# Patient Record
Sex: FEMALE | Race: WHITE | NOT HISPANIC OR LATINO | Employment: FULL TIME | URBAN - METROPOLITAN AREA
[De-identification: names, ages, dates, MRNs, and addresses within clinical notes are randomized per-mention and may not be internally consistent; named-entity substitution may affect disease eponyms.]

---

## 2019-12-02 ENCOUNTER — TELEPHONE (OUTPATIENT)
Dept: FAMILY MEDICINE | Facility: CLINIC | Age: 49
End: 2019-12-02

## 2019-12-04 ENCOUNTER — TELEPHONE (OUTPATIENT)
Dept: FAMILY MEDICINE | Facility: CLINIC | Age: 49
End: 2019-12-04

## 2019-12-04 ENCOUNTER — OFFICE VISIT (OUTPATIENT)
Dept: FAMILY MEDICINE | Facility: CLINIC | Age: 49
End: 2019-12-04
Payer: COMMERCIAL

## 2019-12-04 ENCOUNTER — HOSPITAL ENCOUNTER (OUTPATIENT)
Dept: RADIOLOGY | Facility: HOSPITAL | Age: 49
Discharge: HOME | End: 2019-12-04
Attending: NURSE PRACTITIONER
Payer: COMMERCIAL

## 2019-12-04 VITALS
WEIGHT: 193.81 LBS | BODY MASS INDEX: 27.75 KG/M2 | HEIGHT: 70 IN | DIASTOLIC BLOOD PRESSURE: 86 MMHG | HEART RATE: 96 BPM | SYSTOLIC BLOOD PRESSURE: 122 MMHG

## 2019-12-04 DIAGNOSIS — M79.671 RIGHT FOOT PAIN: ICD-10-CM

## 2019-12-04 DIAGNOSIS — Z12.31 SCREENING MAMMOGRAM FOR HIGH-RISK PATIENT: Primary | ICD-10-CM

## 2019-12-04 DIAGNOSIS — J30.9 ALLERGIC RHINITIS, UNSPECIFIED SEASONALITY, UNSPECIFIED TRIGGER: ICD-10-CM

## 2019-12-04 DIAGNOSIS — M79.671 RIGHT FOOT PAIN: Primary | ICD-10-CM

## 2019-12-04 DIAGNOSIS — E78.5 HYPERLIPIDEMIA, UNSPECIFIED HYPERLIPIDEMIA TYPE: ICD-10-CM

## 2019-12-04 PROCEDURE — 3008F PR BODY MASS INDEX (BMI) DOCUMENTED: ICD-10-PCS | Mod: S$GLB,,, | Performed by: NURSE PRACTITIONER

## 2019-12-04 PROCEDURE — 99213 OFFICE O/P EST LOW 20 MIN: CPT | Mod: S$GLB,,, | Performed by: NURSE PRACTITIONER

## 2019-12-04 PROCEDURE — 73620 X-RAY EXAM OF FOOT: CPT | Mod: TC,PO,RT

## 2019-12-04 PROCEDURE — 99213 PR OFFICE/OUTPT VISIT, EST, LEVL III, 20-29 MIN: ICD-10-PCS | Mod: S$GLB,,, | Performed by: NURSE PRACTITIONER

## 2019-12-04 PROCEDURE — 3008F BODY MASS INDEX DOCD: CPT | Mod: S$GLB,,, | Performed by: NURSE PRACTITIONER

## 2019-12-04 RX ORDER — METHYLPREDNISOLONE 4 MG/1
TABLET ORAL
Qty: 1 PACKAGE | Refills: 0 | Status: SHIPPED | OUTPATIENT
Start: 2019-12-04 | End: 2019-12-25

## 2019-12-04 RX ORDER — NORGESTIMATE AND ETHINYL ESTRADIOL 0.25-0.035
1 KIT ORAL DAILY
Refills: 3 | COMMUNITY
Start: 2019-11-15

## 2019-12-04 NOTE — PROGRESS NOTES
"  SUBJECTIVE:    Patient ID: Abbi Snider is a 49 y.o. female.    Chief Complaint: Foot Pain (Pt presents with RIGHT foot pain and swollen for appx 1 week.....Reports the pain in worse where toes meet foot.....mlr)    Presents with complaints of right foot pain. Started 1 week ago. Denies injury or trauma. Pain is constant. Worse with weight bearing and when bending foot. Has not taken anything.       No visits with results within 6 Month(s) from this visit.   Latest known visit with results is:   No results found for any previous visit.       History reviewed. No pertinent past medical history.  History reviewed. No pertinent surgical history.  History reviewed. No pertinent family history.    Marital Status:   Alcohol History:  reports that she drinks alcohol.  Tobacco History:  reports that she has never smoked. She has never used smokeless tobacco.  Drug History:  reports that she does not use drugs.    Review of patient's allergies indicates:  No Known Allergies    Current Outpatient Medications:     ESTARYLLA 0.25-35 mg-mcg per tablet, Take 1 tablet by mouth once daily., Disp: , Rfl: 3    Review of Systems   Constitutional: Negative for activity change, chills and fever.   Respiratory: Negative for cough and shortness of breath.    Cardiovascular: Negative for chest pain.   Gastrointestinal: Negative for abdominal pain, diarrhea, nausea and vomiting.   Genitourinary: Negative for difficulty urinating and flank pain.   Musculoskeletal: Negative for back pain and gait problem.        Right foot pain     Neurological: Negative for dizziness and weakness.          Objective:      Vitals:    12/04/19 1346   BP: 122/86   Pulse: 96   Weight: 87.9 kg (193 lb 12.8 oz)   Height: 5' 10" (1.778 m)     Body mass index is 27.81 kg/m².  Physical Exam   Constitutional: She appears well-developed and well-nourished.   Cardiovascular: Normal rate and regular rhythm.   Pulmonary/Chest: Effort normal and breath " sounds normal. Right breast exhibits no inverted nipple, no mass and no nipple discharge. No breast swelling, tenderness or discharge.   Abdominal: Soft. Bowel sounds are normal.   Genitourinary: No breast swelling, tenderness or discharge. Pelvic exam was performed with patient supine. Cervix exhibits no motion tenderness. Right adnexum displays no mass and no tenderness. Left adnexum displays no mass and no tenderness. No vaginal discharge found.   Musculoskeletal:        Feet:    Negative homans sign         Assessment:       1. Right foot pain    2. Hyperlipidemia, unspecified hyperlipidemia type    3. Allergic rhinitis, unspecified seasonality, unspecified trigger         Plan:       Right foot pain  Comments:  xray today. will call with results.   Orders:  -     X-Ray Foot 2 View Right; Future    Hyperlipidemia, unspecified hyperlipidemia type    Allergic rhinitis, unspecified seasonality, unspecified trigger      Follow up if symptoms worsen or fail to improve.

## 2019-12-04 NOTE — TELEPHONE ENCOUNTER
Spoke with pt and let her know per Sofi the xray shows no fx - agrees to start medrol dose pack and call if sx do not improve

## 2019-12-04 NOTE — TELEPHONE ENCOUNTER
----- Message from Celia Serrano sent at 12/4/2019  3:20 PM CST -----  Contact: Abbi Clementier  Patient is calling she would like to know the results of her x ray that she had done recently. Please give her a call back   Pt# [phone]

## 2020-01-03 ENCOUNTER — HOSPITAL ENCOUNTER (OUTPATIENT)
Dept: RADIOLOGY | Facility: HOSPITAL | Age: 50
Discharge: HOME | End: 2020-01-03
Attending: OBSTETRICS & GYNECOLOGY
Payer: COMMERCIAL

## 2020-01-03 VITALS — BODY MASS INDEX: 27.75 KG/M2 | WEIGHT: 193.81 LBS | HEIGHT: 70 IN

## 2020-01-03 DIAGNOSIS — Z12.31 SCREENING MAMMOGRAM FOR HIGH-RISK PATIENT: ICD-10-CM

## 2020-01-03 PROCEDURE — 77067 SCR MAMMO BI INCL CAD: CPT | Mod: TC,PO

## 2021-02-01 DIAGNOSIS — Z12.31 ENCOUNTER FOR SCREENING MAMMOGRAM FOR MALIGNANT NEOPLASM OF BREAST: Primary | ICD-10-CM

## 2021-03-05 ENCOUNTER — HOSPITAL ENCOUNTER (OUTPATIENT)
Dept: RADIOLOGY | Facility: HOSPITAL | Age: 51
Discharge: HOME | End: 2021-03-05
Attending: OBSTETRICS & GYNECOLOGY
Payer: COMMERCIAL

## 2021-03-05 DIAGNOSIS — Z12.31 ENCOUNTER FOR SCREENING MAMMOGRAM FOR MALIGNANT NEOPLASM OF BREAST: ICD-10-CM

## 2021-03-05 PROCEDURE — 77067 SCR MAMMO BI INCL CAD: CPT | Mod: TC,PO

## 2022-02-09 ENCOUNTER — OFFICE VISIT (OUTPATIENT)
Dept: FAMILY MEDICINE | Facility: CLINIC | Age: 52
End: 2022-02-09
Payer: COMMERCIAL

## 2022-02-09 VITALS
WEIGHT: 188 LBS | SYSTOLIC BLOOD PRESSURE: 120 MMHG | BODY MASS INDEX: 26.92 KG/M2 | HEIGHT: 70 IN | DIASTOLIC BLOOD PRESSURE: 76 MMHG | HEART RATE: 74 BPM

## 2022-02-09 DIAGNOSIS — E78.2 MIXED HYPERLIPIDEMIA: ICD-10-CM

## 2022-02-09 DIAGNOSIS — Z12.11 SPECIAL SCREENING FOR MALIGNANT NEOPLASMS, COLON: ICD-10-CM

## 2022-02-09 DIAGNOSIS — Z00.00 WELLNESS EXAMINATION: Primary | ICD-10-CM

## 2022-02-09 PROCEDURE — 3074F SYST BP LT 130 MM HG: CPT | Mod: S$GLB,,, | Performed by: FAMILY MEDICINE

## 2022-02-09 PROCEDURE — 3008F BODY MASS INDEX DOCD: CPT | Mod: S$GLB,,, | Performed by: FAMILY MEDICINE

## 2022-02-09 PROCEDURE — 3078F PR MOST RECENT DIASTOLIC BLOOD PRESSURE < 80 MM HG: ICD-10-PCS | Mod: S$GLB,,, | Performed by: FAMILY MEDICINE

## 2022-02-09 PROCEDURE — 3078F DIAST BP <80 MM HG: CPT | Mod: S$GLB,,, | Performed by: FAMILY MEDICINE

## 2022-02-09 PROCEDURE — 3074F PR MOST RECENT SYSTOLIC BLOOD PRESSURE < 130 MM HG: ICD-10-PCS | Mod: S$GLB,,, | Performed by: FAMILY MEDICINE

## 2022-02-09 PROCEDURE — 99396 PREV VISIT EST AGE 40-64: CPT | Mod: S$GLB,,, | Performed by: FAMILY MEDICINE

## 2022-02-09 PROCEDURE — 99396 PR PREVENTIVE VISIT,EST,40-64: ICD-10-PCS | Mod: S$GLB,,, | Performed by: FAMILY MEDICINE

## 2022-02-09 PROCEDURE — 3008F PR BODY MASS INDEX (BMI) DOCUMENTED: ICD-10-PCS | Mod: S$GLB,,, | Performed by: FAMILY MEDICINE

## 2022-02-09 NOTE — PROGRESS NOTES
SUBJECTIVE:    Patient ID: Abbi Snider is a 51 y.o. female.    Chief Complaint: Follow-up (No medicine // blood work // declined Flu shot //abc )    51-year-old female here for wellness physical.  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  For exercise she does weightlifting, and walks in her apartment complex for 20 minutes at a time.    History of a heart murmur in the 1990s.  Asymptomatic.    Gyn-Dr. Ch-does Pap smears and mammograms.  Still has menses and has not gone through menopause yet.    Family history-mother had a liver transplant secondary to NIEVES.  Father had CHF and atrial fibrillation, a few cardioversions done.    Interested in the Shingrix vaccine.  Had chickenpox as a child      No visits with results within 6 Month(s) from this visit.   Latest known visit with results is:   No results found for any previous visit.       No past medical history on file.  Social History     Socioeconomic History    Marital status:    Tobacco Use    Smoking status: Never Smoker    Smokeless tobacco: Never Used   Substance and Sexual Activity    Alcohol use: Yes     Comment: Rare glass of red wine    Drug use: Never     No past surgical history on file.  Family History   Problem Relation Age of Onset    Breast cancer Maternal Grandmother     Cancer Maternal Grandmother        Review of patient's allergies indicates:  No Known Allergies    Current Outpatient Medications:     ESTARYLLA 0.25-35 mg-mcg per tablet, Take 1 tablet by mouth once daily., Disp: , Rfl: 3    Review of Systems   Constitutional: Negative for appetite change, chills, fatigue, fever and unexpected weight change.   HENT: Negative for congestion, ear pain, sinus pain, sore throat and trouble swallowing.    Eyes: Negative for pain, discharge and visual disturbance.   Respiratory: Negative for apnea, cough, shortness  "of breath and wheezing.    Cardiovascular: Negative for chest pain, palpitations and leg swelling.   Gastrointestinal: Negative for abdominal pain, blood in stool, constipation, diarrhea, nausea and vomiting.   Endocrine: Negative for heat intolerance, polydipsia and polyuria.        Still having menses   Genitourinary: Negative for difficulty urinating, dyspareunia, dysuria, frequency, hematuria and menstrual problem.   Musculoskeletal: Negative for arthralgias, back pain, gait problem, joint swelling and myalgias.   Allergic/Immunologic: Negative for environmental allergies, food allergies and immunocompromised state.   Neurological: Negative for dizziness, tremors, seizures, numbness and headaches.   Psychiatric/Behavioral: Negative for behavioral problems, confusion, hallucinations and suicidal ideas. The patient is not nervous/anxious.           Objective:      Vitals:    02/09/22 0955   BP: 120/76   Pulse: 74   Weight: 85.3 kg (188 lb)   Height: 5' 10" (1.778 m)     Physical Exam  Vitals and nursing note reviewed.   Constitutional:       General: She is not in acute distress.     Appearance: Normal appearance. She is well-developed and well-nourished. She is not toxic-appearing.   HENT:      Head: Normocephalic and atraumatic.      Right Ear: Tympanic membrane and external ear normal.      Left Ear: Tympanic membrane and external ear normal.      Nose: Nose normal.      Mouth/Throat:      Mouth: Oropharynx is clear and moist.      Pharynx: Oropharynx is clear.   Eyes:      Extraocular Movements: EOM normal.      Pupils: Pupils are equal, round, and reactive to light.   Neck:      Thyroid: No thyromegaly.      Vascular: No carotid bruit.   Cardiovascular:      Rate and Rhythm: Normal rate and regular rhythm.      Pulses: Intact distal pulses.      Heart sounds: Normal heart sounds. No murmur heard.      Pulmonary:      Effort: Pulmonary effort is normal.      Breath sounds: Normal breath sounds. No wheezing or " rales.   Abdominal:      General: Bowel sounds are normal. There is no distension.      Palpations: Abdomen is soft. There is no hepatosplenomegaly.      Tenderness: There is no abdominal tenderness.   Musculoskeletal:         General: No tenderness or deformity. Normal range of motion.      Cervical back: Normal range of motion and neck supple.      Lumbar back: Normal. No pain or spasms.      Comments: Bends 90 degrees at  waist, shoulders have good range of motion knees are minimal crepitance and no pitting edema to lower extremities   Lymphadenopathy:      Cervical: No cervical adenopathy.   Skin:     General: Skin is warm and dry.      Findings: No rash.   Neurological:      Mental Status: She is alert and oriented to person, place, and time.      Cranial Nerves: No cranial nerve deficit.      Coordination: Coordination normal.   Psychiatric:         Mood and Affect: Mood and affect normal.         Behavior: Behavior normal.         Thought Content: Thought content normal.         Judgment: Judgment normal.           Assessment:       1. Wellness examination    2. Mixed hyperlipidemia         Plan:       Wellness examination  -     CBC Auto Differential; Future; Expected date: 02/09/2022  -     Comprehensive Metabolic Panel; Future; Expected date: 02/09/2022  -     Lipid Panel; Future; Expected date: 02/09/2022  -     TSH w/reflex to FT4; Future; Expected date: 02/09/2022  -     Urinalysis, Reflex to Urine Culture Urine, Clean Catch; Future; Expected date: 02/09/2022  Patient has a healthy lifestyle.  She takes birth control from her gyn.  Recommend referral for colonoscopy  Mixed hyperlipidemia  Labs ordered to check her lipids.   Recommend Shingrix vaccine at a local pharmacy  Follow up in about 1 year (around 2/9/2023) for Annual Physical.        2/9/2022 Rick Ramos

## 2022-02-10 LAB
ALBUMIN SERPL-MCNC: 3.9 G/DL (ref 3.6–5.1)
ALBUMIN/GLOB SERPL: 1.4 (CALC) (ref 1–2.5)
ALP SERPL-CCNC: 59 U/L (ref 37–153)
ALT SERPL-CCNC: 11 U/L (ref 6–29)
AST SERPL-CCNC: 17 U/L (ref 10–35)
BASOPHILS # BLD AUTO: 32 CELLS/UL (ref 0–200)
BASOPHILS NFR BLD AUTO: 0.5 %
BILIRUB SERPL-MCNC: 0.4 MG/DL (ref 0.2–1.2)
BUN SERPL-MCNC: 11 MG/DL (ref 7–25)
BUN/CREAT SERPL: NORMAL (CALC) (ref 6–22)
CALCIUM SERPL-MCNC: 9.5 MG/DL (ref 8.6–10.4)
CHLORIDE SERPL-SCNC: 104 MMOL/L (ref 98–110)
CHOLEST SERPL-MCNC: 207 MG/DL
CHOLEST/HDLC SERPL: 2.7 (CALC)
CO2 SERPL-SCNC: 28 MMOL/L (ref 20–32)
CREAT SERPL-MCNC: 0.81 MG/DL (ref 0.5–1.05)
EOSINOPHIL # BLD AUTO: 122 CELLS/UL (ref 15–500)
EOSINOPHIL NFR BLD AUTO: 1.9 %
ERYTHROCYTE [DISTWIDTH] IN BLOOD BY AUTOMATED COUNT: 13.2 % (ref 11–15)
GLOBULIN SER CALC-MCNC: 2.8 G/DL (CALC) (ref 1.9–3.7)
GLUCOSE SERPL-MCNC: 86 MG/DL (ref 65–99)
HCT VFR BLD AUTO: 40.7 % (ref 35–45)
HDLC SERPL-MCNC: 78 MG/DL
HGB BLD-MCNC: 13.1 G/DL (ref 11.7–15.5)
LDLC SERPL CALC-MCNC: 107 MG/DL (CALC)
LYMPHOCYTES # BLD AUTO: 1491 CELLS/UL (ref 850–3900)
LYMPHOCYTES NFR BLD AUTO: 23.3 %
MCH RBC QN AUTO: 30.4 PG (ref 27–33)
MCHC RBC AUTO-ENTMCNC: 32.2 G/DL (ref 32–36)
MCV RBC AUTO: 94.4 FL (ref 80–100)
MONOCYTES # BLD AUTO: 390 CELLS/UL (ref 200–950)
MONOCYTES NFR BLD AUTO: 6.1 %
NEUTROPHILS # BLD AUTO: 4365 CELLS/UL (ref 1500–7800)
NEUTROPHILS NFR BLD AUTO: 68.2 %
NONHDLC SERPL-MCNC: 129 MG/DL (CALC)
PLATELET # BLD AUTO: 221 THOUSAND/UL (ref 140–400)
PMV BLD REES-ECKER: 10.3 FL (ref 7.5–12.5)
POTASSIUM SERPL-SCNC: 4.2 MMOL/L (ref 3.5–5.3)
PROT SERPL-MCNC: 6.7 G/DL (ref 6.1–8.1)
RBC # BLD AUTO: 4.31 MILLION/UL (ref 3.8–5.1)
SODIUM SERPL-SCNC: 137 MMOL/L (ref 135–146)
TRIGL SERPL-MCNC: 119 MG/DL
TSH SERPL-ACNC: 1.13 MIU/L
WBC # BLD AUTO: 6.4 THOUSAND/UL (ref 3.8–10.8)

## 2022-02-13 NOTE — PROGRESS NOTES
Call patient.  Her lab work looks very good.  CBC shows no anemia.  Sugar kidneys liver all normal.  Cholesterol mild elevation that 207. Thyroid normal.  Cut back on fried foods and fast food to reduce cholesterol.

## 2022-02-14 ENCOUNTER — TELEPHONE (OUTPATIENT)
Dept: FAMILY MEDICINE | Facility: CLINIC | Age: 52
End: 2022-02-14
Payer: COMMERCIAL

## 2022-02-14 NOTE — TELEPHONE ENCOUNTER
LMOR letting pt know she can come  the form any time - her portion was unsigned so it was not faxed.

## 2022-02-14 NOTE — TELEPHONE ENCOUNTER
Spoke to patient with results verbatim per Dr Ramos. Verbalized understanding on all, including dietary restrictions.

## 2022-02-14 NOTE — TELEPHONE ENCOUNTER
----- Message from Rick Ramos MD sent at 2/12/2022  9:17 PM CST -----  Call patient.  Her lab work looks very good.  CBC shows no anemia.  Sugar kidneys liver all normal.  Cholesterol mild elevation that 207. Thyroid normal.  Cut back on fried foods and fast food to reduce cholesterol.

## 2022-02-16 ENCOUNTER — TELEPHONE (OUTPATIENT)
Dept: FAMILY MEDICINE | Facility: CLINIC | Age: 52
End: 2022-02-16
Payer: COMMERCIAL

## 2022-02-16 NOTE — TELEPHONE ENCOUNTER
----- Message from Kathy Rodríguez sent at 2/16/2022 12:54 PM CST -----  Pt calling said she is coming to the office today to sign Biometric screening forms if they could be up front and ready.    # [phone]

## 2022-02-17 ENCOUNTER — TELEPHONE (OUTPATIENT)
Dept: FAMILY MEDICINE | Facility: CLINIC | Age: 52
End: 2022-02-17
Payer: COMMERCIAL

## 2022-02-17 NOTE — TELEPHONE ENCOUNTER
----- Message from Kathy Rodríguez sent at 2/17/2022 11:48 AM CST -----  Pt called said the physical form she came and signed and faxed yesteday does not show the date of collection for the blood test.  Said this has to be on the form wants the date of 2/9/2022 on the  box and refax to the number on the form.

## 2022-03-18 DIAGNOSIS — Z12.31 ENCOUNTER FOR SCREENING MAMMOGRAM FOR BREAST CANCER: Primary | ICD-10-CM

## 2022-04-01 ENCOUNTER — HOSPITAL ENCOUNTER (OUTPATIENT)
Dept: RADIOLOGY | Facility: HOSPITAL | Age: 52
Discharge: HOME | End: 2022-04-01
Attending: OBSTETRICS & GYNECOLOGY
Payer: COMMERCIAL

## 2022-04-01 VITALS — HEIGHT: 70 IN | WEIGHT: 188.06 LBS | BODY MASS INDEX: 26.92 KG/M2

## 2022-04-01 DIAGNOSIS — Z12.31 ENCOUNTER FOR SCREENING MAMMOGRAM FOR BREAST CANCER: ICD-10-CM

## 2022-04-01 PROCEDURE — 77063 BREAST TOMOSYNTHESIS BI: CPT | Mod: TC,PO

## 2023-02-02 ENCOUNTER — TELEPHONE (OUTPATIENT)
Dept: FAMILY MEDICINE | Facility: CLINIC | Age: 53
End: 2023-02-02

## 2023-02-02 NOTE — TELEPHONE ENCOUNTER
----- Message from Erendira Gill sent at 2/2/2023 10:55 AM CST -----  Pt would like to  get an annual visit scheduled. [phone]

## 2023-03-07 ENCOUNTER — TELEPHONE (OUTPATIENT)
Dept: FAMILY MEDICINE | Facility: CLINIC | Age: 53
End: 2023-03-07

## 2023-03-07 DIAGNOSIS — E78.2 MIXED HYPERLIPIDEMIA: ICD-10-CM

## 2023-03-07 DIAGNOSIS — Z79.899 ENCOUNTER FOR LONG-TERM (CURRENT) USE OF OTHER MEDICATIONS: ICD-10-CM

## 2023-03-07 DIAGNOSIS — Z00.00 WELLNESS EXAMINATION: Primary | ICD-10-CM

## 2023-03-07 NOTE — TELEPHONE ENCOUNTER
----- Message from Kendra Phelps sent at 3/7/2023 10:30 AM CST -----  Patient called and stated that she has an appointment on Thursday and she would like to know if she need to have lab work done please give her a call at [phone]

## 2023-03-09 ENCOUNTER — OFFICE VISIT (OUTPATIENT)
Dept: FAMILY MEDICINE | Facility: CLINIC | Age: 53
End: 2023-03-09
Payer: COMMERCIAL

## 2023-03-09 ENCOUNTER — TELEPHONE (OUTPATIENT)
Dept: FAMILY MEDICINE | Facility: CLINIC | Age: 53
End: 2023-03-09

## 2023-03-09 VITALS
HEIGHT: 70 IN | WEIGHT: 197.38 LBS | SYSTOLIC BLOOD PRESSURE: 120 MMHG | HEART RATE: 91 BPM | OXYGEN SATURATION: 98 % | BODY MASS INDEX: 28.26 KG/M2 | DIASTOLIC BLOOD PRESSURE: 70 MMHG

## 2023-03-09 DIAGNOSIS — Z12.11 COLON CANCER SCREENING: Primary | ICD-10-CM

## 2023-03-09 DIAGNOSIS — S93.402D SPRAIN OF LEFT ANKLE, UNSPECIFIED LIGAMENT, SUBSEQUENT ENCOUNTER: ICD-10-CM

## 2023-03-09 DIAGNOSIS — J30.9 ALLERGIC RHINITIS, UNSPECIFIED SEASONALITY, UNSPECIFIED TRIGGER: ICD-10-CM

## 2023-03-09 DIAGNOSIS — Z00.00 WELLNESS EXAMINATION: ICD-10-CM

## 2023-03-09 PROCEDURE — 1159F MED LIST DOCD IN RCRD: CPT | Mod: CPTII,S$GLB,, | Performed by: NURSE PRACTITIONER

## 2023-03-09 PROCEDURE — 1160F RVW MEDS BY RX/DR IN RCRD: CPT | Mod: CPTII,S$GLB,, | Performed by: NURSE PRACTITIONER

## 2023-03-09 PROCEDURE — 3008F PR BODY MASS INDEX (BMI) DOCUMENTED: ICD-10-PCS | Mod: CPTII,S$GLB,, | Performed by: NURSE PRACTITIONER

## 2023-03-09 PROCEDURE — 99396 PR PREVENTIVE VISIT,EST,40-64: ICD-10-PCS | Mod: S$GLB,,, | Performed by: NURSE PRACTITIONER

## 2023-03-09 PROCEDURE — 3074F PR MOST RECENT SYSTOLIC BLOOD PRESSURE < 130 MM HG: ICD-10-PCS | Mod: CPTII,S$GLB,, | Performed by: NURSE PRACTITIONER

## 2023-03-09 PROCEDURE — 1160F PR REVIEW ALL MEDS BY PRESCRIBER/CLIN PHARMACIST DOCUMENTED: ICD-10-PCS | Mod: CPTII,S$GLB,, | Performed by: NURSE PRACTITIONER

## 2023-03-09 PROCEDURE — 3008F BODY MASS INDEX DOCD: CPT | Mod: CPTII,S$GLB,, | Performed by: NURSE PRACTITIONER

## 2023-03-09 PROCEDURE — 3074F SYST BP LT 130 MM HG: CPT | Mod: CPTII,S$GLB,, | Performed by: NURSE PRACTITIONER

## 2023-03-09 PROCEDURE — 3078F PR MOST RECENT DIASTOLIC BLOOD PRESSURE < 80 MM HG: ICD-10-PCS | Mod: CPTII,S$GLB,, | Performed by: NURSE PRACTITIONER

## 2023-03-09 PROCEDURE — 99396 PREV VISIT EST AGE 40-64: CPT | Mod: S$GLB,,, | Performed by: NURSE PRACTITIONER

## 2023-03-09 PROCEDURE — 3078F DIAST BP <80 MM HG: CPT | Mod: CPTII,S$GLB,, | Performed by: NURSE PRACTITIONER

## 2023-03-09 PROCEDURE — 1159F PR MEDICATION LIST DOCUMENTED IN MEDICAL RECORD: ICD-10-PCS | Mod: CPTII,S$GLB,, | Performed by: NURSE PRACTITIONER

## 2023-03-09 NOTE — TELEPHONE ENCOUNTER
----- Message from Dangelo Schmitz MA sent at 3/9/2023 10:51 AM CST -----  [phone] reminder to ask Sofi if this Pt has a f/u date if appt is needed please call pt with assigned date

## 2023-03-09 NOTE — PROGRESS NOTES
SUBJECTIVE:    Patient ID: Abbi Snider is a 52 y.o. female.    Chief Complaint: Annual Exam (No bottles/ Annual visit/ Left ankle swollen due to a fall 3 weeks ago/ Flu given 10/2022 at her job/Colon ordered/)    52-year-old female here for wellness physical.  Overall she is doing well. No significant medical history. She is do for labs. Plans to do today. Due for mammogram at the end of the month. No colonoscopy. Order placed today. Sees dr. Ch for yearly pap. She stays active by exercising several days per week. 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  She is reporting a fall at work 3 weeks ago. Has been having pain to left ankle since. Swelling to ankle has significantly improved. Minimal pain with weight bearing.       No visits with results within 6 Month(s) from this visit.   Latest known visit with results is:   Office Visit on 02/09/2022   Component Date Value Ref Range Status    WBC 02/09/2022 6.4  3.8 - 10.8 Thousand/uL Final    RBC 02/09/2022 4.31  3.80 - 5.10 Million/uL Final    Hemoglobin 02/09/2022 13.1  11.7 - 15.5 g/dL Final    Hematocrit 02/09/2022 40.7  35.0 - 45.0 % Final    MCV 02/09/2022 94.4  80.0 - 100.0 fL Final    MCH 02/09/2022 30.4  27.0 - 33.0 pg Final    MCHC 02/09/2022 32.2  32.0 - 36.0 g/dL Final    RDW 02/09/2022 13.2  11.0 - 15.0 % Final    Platelets 02/09/2022 221  140 - 400 Thousand/uL Final    MPV 02/09/2022 10.3  7.5 - 12.5 fL Final    Neutrophils, Abs 02/09/2022 4,365  1,500 - 7,800 cells/uL Final    Lymph # 02/09/2022 1,491  850 - 3,900 cells/uL Final    Mono # 02/09/2022 390  200 - 950 cells/uL Final    Eos # 02/09/2022 122  15 - 500 cells/uL Final    Baso # 02/09/2022 32  0 - 200 cells/uL Final    Neutrophils Relative 02/09/2022 68.2  % Final    Lymph % 02/09/2022 23.3  % Final    Mono % 02/09/2022 6.1  % Final    Eosinophil % 02/09/2022 1.9  % Final    Basophil % 02/09/2022 0.5  % Final    Glucose 02/09/2022 86  65 - 99 mg/dL Final     BUN 02/09/2022 11  7 - 25 mg/dL Final    Creatinine 02/09/2022 0.81  0.50 - 1.05 mg/dL Final    eGFR if non African American 02/09/2022 84  > OR = 60 mL/min/1.73m2 Final    eGFR if African American 02/09/2022 97  > OR = 60 mL/min/1.73m2 Final    BUN/Creatinine Ratio 02/09/2022 NOT APPLICABLE  6 - 22 (calc) Final    Sodium 02/09/2022 137  135 - 146 mmol/L Final    Potassium 02/09/2022 4.2  3.5 - 5.3 mmol/L Final    Chloride 02/09/2022 104  98 - 110 mmol/L Final    CO2 02/09/2022 28  20 - 32 mmol/L Final    Calcium 02/09/2022 9.5  8.6 - 10.4 mg/dL Final    Total Protein 02/09/2022 6.7  6.1 - 8.1 g/dL Final    Albumin 02/09/2022 3.9  3.6 - 5.1 g/dL Final    Globulin, Total 02/09/2022 2.8  1.9 - 3.7 g/dL (calc) Final    Albumin/Globulin Ratio 02/09/2022 1.4  1.0 - 2.5 (calc) Final    Total Bilirubin 02/09/2022 0.4  0.2 - 1.2 mg/dL Final    Alkaline Phosphatase 02/09/2022 59  37 - 153 U/L Final    AST 02/09/2022 17  10 - 35 U/L Final    ALT 02/09/2022 11  6 - 29 U/L Final    Cholesterol 02/09/2022 207 (H)  <200 mg/dL Final    HDL 02/09/2022 78  > OR = 50 mg/dL Final    Triglycerides 02/09/2022 119  <150 mg/dL Final    LDL Cholesterol 02/09/2022 107 (H)  mg/dL (calc) Final    HDL/Cholesterol Ratio 02/09/2022 2.7  <5.0 (calc) Final    Non HDL Chol. (LDL+VLDL) 02/09/2022 129  <130 mg/dL (calc) Final    TSH w/reflex to FT4 02/09/2022 1.13  mIU/L Final       History reviewed. No pertinent past medical history.  Social History     Socioeconomic History    Marital status:    Tobacco Use    Smoking status: Never    Smokeless tobacco: Never   Substance and Sexual Activity    Alcohol use: Yes     Comment: Rare glass of red wine    Drug use: Never     History reviewed. No pertinent surgical history.  Family History   Problem Relation Age of Onset    Breast cancer Maternal Grandmother     Cancer Maternal Grandmother        Review of patient's allergies indicates:  No Known Allergies    Current Outpatient Medications:      "ESTARYLLA 0.25-35 mg-mcg per tablet, Take 1 tablet by mouth once daily., Disp: , Rfl: 3    Review of Systems   Constitutional:  Negative for chills, fever and unexpected weight change.   HENT:  Negative for ear pain, rhinorrhea and sore throat.    Eyes:  Negative for pain and visual disturbance.   Respiratory:  Negative for cough and shortness of breath.    Cardiovascular:  Negative for chest pain, palpitations and leg swelling.   Gastrointestinal:  Negative for abdominal pain, diarrhea, nausea and vomiting.   Genitourinary:  Negative for difficulty urinating, hematuria and vaginal bleeding.   Musculoskeletal:  Negative for arthralgias.        Ankle   Skin:  Negative for rash.   Neurological:  Negative for dizziness, weakness and headaches.   Psychiatric/Behavioral:  Negative for agitation and sleep disturbance. The patient is not nervous/anxious.         Objective:      Vitals:    03/09/23 0940   BP: 120/70   Pulse: 91   SpO2: 98%   Weight: 89.5 kg (197 lb 6.4 oz)   Height: 5' 10" (1.778 m)     Physical Exam  Vitals and nursing note reviewed.   Constitutional:       General: She is not in acute distress.     Appearance: Normal appearance. She is well-developed.   HENT:      Head: Normocephalic.      Right Ear: External ear normal.      Left Ear: External ear normal.   Eyes:      Conjunctiva/sclera: Conjunctivae normal.      Pupils: Pupils are equal, round, and reactive to light.   Neck:      Vascular: No JVD.   Cardiovascular:      Rate and Rhythm: Normal rate and regular rhythm.      Heart sounds: No murmur heard.  Pulmonary:      Effort: Pulmonary effort is normal.      Breath sounds: Normal breath sounds.   Abdominal:      General: Bowel sounds are normal.      Palpations: Abdomen is soft.   Musculoskeletal:         General: No deformity. Normal range of motion.      Cervical back: Normal range of motion and neck supple.      Left ankle: Swelling present. No deformity. Tenderness present over the lateral " malleolus. Normal range of motion.        Legs:    Lymphadenopathy:      Cervical: No cervical adenopathy.   Skin:     General: Skin is warm and dry.      Findings: No rash.   Neurological:      Mental Status: She is alert and oriented to person, place, and time.      Gait: Gait normal.   Psychiatric:         Speech: Speech normal.         Behavior: Behavior normal.         Assessment:       1. Colon cancer screening    2. Wellness examination    3. Allergic rhinitis, unspecified seasonality, unspecified trigger    4. Sprain of left ankle, unspecified ligament, subsequent encounter           Plan:       Colon cancer screening  -     Ambulatory referral/consult to Gastroenterology; Future; Expected date: 03/16/2023    Wellness examination    Allergic rhinitis, unspecified seasonality, unspecified trigger    Sprain of left ankle, unspecified ligament, subsequent encounter  Comments:  elevate wrap. call if symtpoms persist      Follow up in about 1 year (around 3/9/2024), or if symptoms worsen or fail to improve, for medication management.        3/10/2023 Sofi Olivares

## 2023-03-10 LAB
ALBUMIN SERPL-MCNC: 3.8 G/DL (ref 3.6–5.1)
ALBUMIN/GLOB SERPL: 1.3 (CALC) (ref 1–2.5)
ALP SERPL-CCNC: 76 U/L (ref 37–153)
ALT SERPL-CCNC: 13 U/L (ref 6–29)
AST SERPL-CCNC: 14 U/L (ref 10–35)
BASOPHILS # BLD AUTO: 32 CELLS/UL (ref 0–200)
BASOPHILS NFR BLD AUTO: 0.5 %
BILIRUB SERPL-MCNC: 0.5 MG/DL (ref 0.2–1.2)
BUN SERPL-MCNC: 9 MG/DL (ref 7–25)
BUN/CREAT SERPL: NORMAL (CALC) (ref 6–22)
CALCIUM SERPL-MCNC: 9.2 MG/DL (ref 8.6–10.4)
CHLORIDE SERPL-SCNC: 104 MMOL/L (ref 98–110)
CHOLEST SERPL-MCNC: 216 MG/DL
CHOLEST/HDLC SERPL: 2.7 (CALC)
CO2 SERPL-SCNC: 27 MMOL/L (ref 20–32)
CREAT SERPL-MCNC: 0.84 MG/DL (ref 0.5–1.03)
EGFR: 84 ML/MIN/1.73M2
EOSINOPHIL # BLD AUTO: 173 CELLS/UL (ref 15–500)
EOSINOPHIL NFR BLD AUTO: 2.7 %
ERYTHROCYTE [DISTWIDTH] IN BLOOD BY AUTOMATED COUNT: 12.5 % (ref 11–15)
GLOBULIN SER CALC-MCNC: 2.9 G/DL (CALC) (ref 1.9–3.7)
GLUCOSE SERPL-MCNC: 87 MG/DL (ref 65–99)
HCT VFR BLD AUTO: 42.8 % (ref 35–45)
HDLC SERPL-MCNC: 79 MG/DL
HGB BLD-MCNC: 14.1 G/DL (ref 11.7–15.5)
LDLC SERPL CALC-MCNC: 117 MG/DL (CALC)
LYMPHOCYTES # BLD AUTO: 1318 CELLS/UL (ref 850–3900)
LYMPHOCYTES NFR BLD AUTO: 20.6 %
MCH RBC QN AUTO: 31.5 PG (ref 27–33)
MCHC RBC AUTO-ENTMCNC: 32.9 G/DL (ref 32–36)
MCV RBC AUTO: 95.7 FL (ref 80–100)
MONOCYTES # BLD AUTO: 429 CELLS/UL (ref 200–950)
MONOCYTES NFR BLD AUTO: 6.7 %
NEUTROPHILS # BLD AUTO: 4448 CELLS/UL (ref 1500–7800)
NEUTROPHILS NFR BLD AUTO: 69.5 %
NONHDLC SERPL-MCNC: 137 MG/DL (CALC)
PLATELET # BLD AUTO: 225 THOUSAND/UL (ref 140–400)
PMV BLD REES-ECKER: 10.2 FL (ref 7.5–12.5)
POTASSIUM SERPL-SCNC: 4.3 MMOL/L (ref 3.5–5.3)
PROT SERPL-MCNC: 6.7 G/DL (ref 6.1–8.1)
RBC # BLD AUTO: 4.47 MILLION/UL (ref 3.8–5.1)
SODIUM SERPL-SCNC: 138 MMOL/L (ref 135–146)
TRIGL SERPL-MCNC: 97 MG/DL
TSH SERPL-ACNC: 0.95 MIU/L
WBC # BLD AUTO: 6.4 THOUSAND/UL (ref 3.8–10.8)

## 2023-03-12 NOTE — PROGRESS NOTES
Call patient.  Cholesterol mild elevation at 216.  Triglycerides normal at 97.  Sugar kidneys liver and thyroid all normal.  CBC shows no anemia.  Reduce fried food and cut back on fast food.

## 2023-03-13 ENCOUNTER — TELEPHONE (OUTPATIENT)
Dept: FAMILY MEDICINE | Facility: CLINIC | Age: 53
End: 2023-03-13

## 2023-03-13 NOTE — TELEPHONE ENCOUNTER
Spoke with pt in regards to recent lab results. Verbalized per Dr. Ramos that pt's cholesterol mild elevation at 216. Triglycerides normal at 97.  Sugar kidneys liver and thyroid all normal.  CBC shows no anemia.  Reduce fried food and cut back on fast food. Pt acknowledge understanding.

## 2023-03-13 NOTE — TELEPHONE ENCOUNTER
----- Message from Rick Ramos MD sent at 3/12/2023  4:34 PM CDT -----  Call patient.  Cholesterol mild elevation at 216.  Triglycerides normal at 97.  Sugar kidneys liver and thyroid all normal.  CBC shows no anemia.  Reduce fried food and cut back on fast food.

## 2023-03-23 DIAGNOSIS — Z12.31 ENCOUNTER FOR SCREENING MAMMOGRAM FOR MALIGNANT NEOPLASM OF BREAST: Primary | ICD-10-CM

## 2023-04-11 ENCOUNTER — PATIENT MESSAGE (OUTPATIENT)
Dept: ADMINISTRATIVE | Facility: HOSPITAL | Age: 53
End: 2023-04-11

## 2023-05-05 ENCOUNTER — HOSPITAL ENCOUNTER (OUTPATIENT)
Dept: RADIOLOGY | Facility: HOSPITAL | Age: 53
Discharge: HOME | End: 2023-05-05
Attending: OBSTETRICS & GYNECOLOGY
Payer: COMMERCIAL

## 2023-05-05 VITALS — WEIGHT: 197.31 LBS | BODY MASS INDEX: 28.25 KG/M2 | HEIGHT: 70 IN

## 2023-05-05 DIAGNOSIS — Z12.31 ENCOUNTER FOR SCREENING MAMMOGRAM FOR MALIGNANT NEOPLASM OF BREAST: ICD-10-CM

## 2023-05-05 PROCEDURE — 77067 SCR MAMMO BI INCL CAD: CPT | Mod: TC,PO

## 2023-05-12 ENCOUNTER — HOSPITAL ENCOUNTER (OUTPATIENT)
Dept: RADIOLOGY | Facility: HOSPITAL | Age: 53
Discharge: HOME | End: 2023-05-12
Attending: OBSTETRICS & GYNECOLOGY
Payer: COMMERCIAL

## 2023-05-12 DIAGNOSIS — R92.8 ABNORMAL MAMMOGRAM: ICD-10-CM

## 2023-05-12 PROCEDURE — 76642 ULTRASOUND BREAST LIMITED: CPT | Mod: TC,PO,LT

## 2023-07-21 ENCOUNTER — PATIENT MESSAGE (OUTPATIENT)
Dept: FAMILY MEDICINE | Facility: CLINIC | Age: 53
End: 2023-07-21

## 2023-09-07 ENCOUNTER — HOSPITAL ENCOUNTER (OUTPATIENT)
Dept: PREADMISSION TESTING | Facility: HOSPITAL | Age: 53
Discharge: HOME | End: 2023-09-07
Attending: INTERNAL MEDICINE
Payer: COMMERCIAL

## 2023-09-07 VITALS
OXYGEN SATURATION: 96 % | HEIGHT: 70 IN | HEART RATE: 84 BPM | WEIGHT: 195 LBS | DIASTOLIC BLOOD PRESSURE: 70 MMHG | SYSTOLIC BLOOD PRESSURE: 109 MMHG | RESPIRATION RATE: 16 BRPM | TEMPERATURE: 98 F | BODY MASS INDEX: 27.92 KG/M2

## 2023-09-07 DIAGNOSIS — Z01.818 PREOP TESTING: Primary | ICD-10-CM

## 2023-09-07 LAB
BASOPHILS # BLD AUTO: 0.02 K/UL (ref 0–0.2)
BASOPHILS NFR BLD: 0.3 % (ref 0–1.9)
DIFFERENTIAL METHOD: ABNORMAL
EOSINOPHIL # BLD AUTO: 0.1 K/UL (ref 0–0.5)
EOSINOPHIL NFR BLD: 1.6 % (ref 0–8)
ERYTHROCYTE [DISTWIDTH] IN BLOOD BY AUTOMATED COUNT: 13.4 % (ref 11.5–14.5)
HCT VFR BLD AUTO: 37.8 % (ref 37–48.5)
HGB BLD-MCNC: 12.1 G/DL (ref 12–16)
IMM GRANULOCYTES # BLD AUTO: 0.01 K/UL (ref 0–0.04)
IMM GRANULOCYTES NFR BLD AUTO: 0.1 % (ref 0–0.5)
LYMPHOCYTES # BLD AUTO: 1.4 K/UL (ref 1–4.8)
LYMPHOCYTES NFR BLD: 21.2 % (ref 18–48)
MCH RBC QN AUTO: 30.4 PG (ref 27–31)
MCHC RBC AUTO-ENTMCNC: 32 G/DL (ref 32–36)
MCV RBC AUTO: 95 FL (ref 82–98)
MONOCYTES # BLD AUTO: 0.5 K/UL (ref 0.3–1)
MONOCYTES NFR BLD: 6.7 % (ref 4–15)
NEUTROPHILS # BLD AUTO: 4.7 K/UL (ref 1.8–7.7)
NEUTROPHILS NFR BLD: 70.1 % (ref 38–73)
NRBC BLD-RTO: 0 /100 WBC
PLATELET # BLD AUTO: 251 K/UL (ref 150–450)
PMV BLD AUTO: 10.3 FL (ref 9.2–12.9)
RBC # BLD AUTO: 3.98 M/UL (ref 4–5.4)
WBC # BLD AUTO: 6.71 K/UL (ref 3.9–12.7)

## 2023-09-07 PROCEDURE — 93010 PR ELECTROCARDIOGRAM REPORT: ICD-10-PCS | Mod: ,,, | Performed by: GENERAL PRACTICE

## 2023-09-07 PROCEDURE — 85025 COMPLETE CBC W/AUTO DIFF WBC: CPT | Performed by: ANESTHESIOLOGY

## 2023-09-07 PROCEDURE — 93010 ELECTROCARDIOGRAM REPORT: CPT | Mod: ,,, | Performed by: GENERAL PRACTICE

## 2023-09-07 PROCEDURE — 93005 ELECTROCARDIOGRAM TRACING: CPT | Performed by: GENERAL PRACTICE

## 2023-09-07 NOTE — DISCHARGE INSTRUCTIONS
To confirm, Your doctor has instructed you that surgery is scheduled for: Tuesday 9/12/23    Endoscopy  will call the afternoon prior to surgery with the final arrival time.  Monday 9/11/23    Please report to Outpatient Registration the morning of surgery.     Do not eat or drink anything after midnight the night before your surgery - THIS INCLUDES  WATER, GUM, MINTS AND CANDY. Follow the preop given by the Doctor    YOU MAY BRUSH YOUR TEETH BUT DO NOT SWALLOW     TAKE ONLY THESE MEDICATIONS WITH A SMALL SIP OF WATER THE MORNING OF YOUR PROCEDURE: see medication list    PLEASE NOTE:  The surgery schedule has many variables which may affect the time of your surgery case.  Family members should be available if your surgery time changes.  Plan to be here the day of your procedure between 2-3 hours.    DO NOT TAKE THESE MEDICATIONS 5-7 DAYS PRIOR to your procedure or per your surgeon's request: ASPIRIN, ALEVE, ADVIL, IBUPROFEN,  CARMELLA SELTZER, BC , FISH OIL , VITAMIN E, HERBALS  (May take Tylenol)                                                   IMPORTANT INSTRUCTIONS    Do not smoke, vape or drink alcoholic beverages 24 hours prior to your procedure.  Shower the night before with  Dial antibacterial soap from the neck down.   You may use your own shampoo and face wash. This helps your skin to be as bacteria free as possible.    If you wear contact lenses, dentures, hearing aids or glasses, bring a container to put them in during surgery and give to a family member for safe keeping.    Please leave all jewelry, piercing's and valuables at home.   ONLY if you wear home oxygen please bring your portable oxygen tank the day of your procedure.   ONLY for patients requiring bowel prep, written instructions will be given by your doctor's office.  Make arrangements in advance for transportation home by a responsible adult.  You must make arrangements for transportation, TAXI'S, UBER'S OR LYFTS ARE NOT ALLOWED.        If you  have any questions about these instructions, call Pre-Op Admit  Nursing at [phone] or the Endoscopy Department at [phone]

## 2024-02-02 ENCOUNTER — TELEPHONE (OUTPATIENT)
Dept: FAMILY MEDICINE | Facility: CLINIC | Age: 54
End: 2024-02-02
Payer: COMMERCIAL

## 2024-02-02 NOTE — TELEPHONE ENCOUNTER
Voicemail full. Pt double books on 2/19     Appointment canceled. Sent portal message to reschedule

## 2024-02-27 ENCOUNTER — TELEPHONE (OUTPATIENT)
Dept: FAMILY MEDICINE | Facility: CLINIC | Age: 54
End: 2024-02-27
Payer: COMMERCIAL

## 2024-02-27 DIAGNOSIS — Z00.00 ROUTINE GENERAL MEDICAL EXAMINATION AT A HEALTH CARE FACILITY: ICD-10-CM

## 2024-02-27 DIAGNOSIS — Z79.899 ENCOUNTER FOR LONG-TERM (CURRENT) USE OF OTHER MEDICATIONS: Primary | ICD-10-CM

## 2024-03-11 ENCOUNTER — OFFICE VISIT (OUTPATIENT)
Dept: FAMILY MEDICINE | Facility: CLINIC | Age: 54
End: 2024-03-11
Payer: COMMERCIAL

## 2024-03-11 VITALS
HEART RATE: 80 BPM | DIASTOLIC BLOOD PRESSURE: 70 MMHG | OXYGEN SATURATION: 98 % | SYSTOLIC BLOOD PRESSURE: 114 MMHG | HEIGHT: 69 IN | BODY MASS INDEX: 25.48 KG/M2 | WEIGHT: 172 LBS

## 2024-03-11 DIAGNOSIS — E78.00 PURE HYPERCHOLESTEROLEMIA: ICD-10-CM

## 2024-03-11 DIAGNOSIS — Z00.00 ROUTINE GENERAL MEDICAL EXAMINATION AT A HEALTH CARE FACILITY: ICD-10-CM

## 2024-03-11 DIAGNOSIS — R01.1 MURMUR, CARDIAC: Primary | ICD-10-CM

## 2024-03-11 DIAGNOSIS — J30.1 SEASONAL ALLERGIC RHINITIS DUE TO POLLEN: ICD-10-CM

## 2024-03-11 DIAGNOSIS — Z00.00 WELLNESS EXAMINATION: ICD-10-CM

## 2024-03-11 DIAGNOSIS — Z12.11 SCREENING FOR COLON CANCER: ICD-10-CM

## 2024-03-11 PROCEDURE — 3074F SYST BP LT 130 MM HG: CPT | Mod: CPTII,S$GLB,, | Performed by: NURSE PRACTITIONER

## 2024-03-11 PROCEDURE — 1159F MED LIST DOCD IN RCRD: CPT | Mod: CPTII,S$GLB,, | Performed by: NURSE PRACTITIONER

## 2024-03-11 PROCEDURE — 1160F RVW MEDS BY RX/DR IN RCRD: CPT | Mod: CPTII,S$GLB,, | Performed by: NURSE PRACTITIONER

## 2024-03-11 PROCEDURE — 3008F BODY MASS INDEX DOCD: CPT | Mod: CPTII,S$GLB,, | Performed by: NURSE PRACTITIONER

## 2024-03-11 PROCEDURE — 99396 PREV VISIT EST AGE 40-64: CPT | Mod: S$GLB,,, | Performed by: NURSE PRACTITIONER

## 2024-03-11 PROCEDURE — 3078F DIAST BP <80 MM HG: CPT | Mod: CPTII,S$GLB,, | Performed by: NURSE PRACTITIONER

## 2024-03-11 NOTE — PROGRESS NOTES
SUBJECTIVE:    Patient ID: Abbi Snider is a 53 y.o. female.    Chief Complaint: Annual Exam (No bottles//Pt here for annual//would like to see GI in [place]//JL)    53-year-old female here for wellness physical.  Overall she is doing well. No significant medical history. She plans to do labs today. No colonoscopy. Order placed today. Did have scheduled but cancelled due to transportation issues. Would like to be scheduled in [place]. Sees dr. Ch for yearly pap and mammogram. She stays active by exercising several days per week. 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  No additional complaints today        No visits with results within 6 Month(s) from this visit.   Latest known visit with results is:   Hospital Outpatient Visit on 09/07/2023   Component Date Value Ref Range Status    WBC 09/07/2023 6.71  3.90 - 12.70 K/uL Final    RBC 09/07/2023 3.98 (L)  4.00 - 5.40 M/uL Final    Hemoglobin 09/07/2023 12.1  12.0 - 16.0 g/dL Final    Hematocrit 09/07/2023 37.8  37.0 - 48.5 % Final    MCV 09/07/2023 95  82 - 98 fL Final    MCH 09/07/2023 30.4  27.0 - 31.0 pg Final    MCHC 09/07/2023 32.0  32.0 - 36.0 g/dL Final    RDW 09/07/2023 13.4  11.5 - 14.5 % Final    Platelets 09/07/2023 251  150 - 450 K/uL Final    MPV 09/07/2023 10.3  9.2 - 12.9 fL Final    Immature Granulocytes 09/07/2023 0.1  0.0 - 0.5 % Final    Gran # (ANC) 09/07/2023 4.7  1.8 - 7.7 K/uL Final    Immature Grans (Abs) 09/07/2023 0.01  0.00 - 0.04 K/uL Final    Lymph # 09/07/2023 1.4  1.0 - 4.8 K/uL Final    Mono # 09/07/2023 0.5  0.3 - 1.0 K/uL Final    Eos # 09/07/2023 0.1  0.0 - 0.5 K/uL Final    Baso # 09/07/2023 0.02  0.00 - 0.20 K/uL Final    nRBC 09/07/2023 0  0 /100 WBC Final    Gran % 09/07/2023 70.1  38.0 - 73.0 % Final    Lymph % 09/07/2023 21.2  18.0 - 48.0 % Final    Mono % 09/07/2023 6.7  4.0 - 15.0 % Final    Eosinophil % 09/07/2023 1.6  0.0 - 8.0 % Final    Basophil % 09/07/2023 0.3  0.0 - 1.9 %  "Final    Differential Method 2023 Automated   Final       Past Medical History:   Diagnosis Date    Murmur, cardiac      Social History     Socioeconomic History    Marital status:    Tobacco Use    Smoking status: Never    Smokeless tobacco: Never   Substance and Sexual Activity    Alcohol use: Not Currently     Comment: Rare glass of red wine    Drug use: Never     Past Surgical History:   Procedure Laterality Date     SECTION, CLASSIC      WISDOM TOOTH EXTRACTION       Family History   Problem Relation Age of Onset    Breast cancer Maternal Grandmother     Cancer Maternal Grandmother        Tests to Keep You Healthy    Mammogram: Met on 2023  Colon Cancer Screening: DUE  Cervical Cancer Screening: Met on 2023      Review of patient's allergies indicates:  No Known Allergies    Current Outpatient Medications:     ESTARYLLA 0.25-35 mg-mcg per tablet, Take 1 tablet by mouth once daily., Disp: , Rfl: 3    multivit-mins no.63/iron/folic (M-VIT ORAL), Take 1 tablet by mouth once daily., Disp: , Rfl:     Review of Systems   Constitutional:  Negative for chills, fever and unexpected weight change.   HENT:  Negative for ear pain, rhinorrhea and sore throat.    Eyes:  Negative for pain and visual disturbance.   Respiratory:  Negative for cough and shortness of breath.    Cardiovascular:  Negative for chest pain, palpitations and leg swelling.   Gastrointestinal:  Negative for abdominal pain, diarrhea, nausea and vomiting.   Genitourinary:  Negative for difficulty urinating, hematuria and vaginal bleeding.   Musculoskeletal:  Negative for arthralgias.   Skin:  Negative for rash.   Neurological:  Negative for dizziness, weakness and headaches.   Psychiatric/Behavioral:  Negative for agitation and sleep disturbance. The patient is not nervous/anxious.           Objective:      Vitals:    24 0852   BP: 114/70   Pulse: 80   SpO2: 98%   Weight: 78 kg (172 lb)   Height: 5' 9" (1.753 m) "     Physical Exam  Vitals and nursing note reviewed.   Constitutional:       General: She is not in acute distress.     Appearance: Normal appearance. She is well-developed.   HENT:      Head: Normocephalic.      Right Ear: External ear normal.      Left Ear: External ear normal.   Eyes:      Conjunctiva/sclera: Conjunctivae normal.      Pupils: Pupils are equal, round, and reactive to light.   Neck:      Vascular: No JVD.   Cardiovascular:      Rate and Rhythm: Normal rate and regular rhythm.      Heart sounds: Murmur heard.      Systolic murmur is present.   Pulmonary:      Effort: Pulmonary effort is normal.      Breath sounds: Normal breath sounds.   Abdominal:      General: Bowel sounds are normal.      Palpations: Abdomen is soft.   Musculoskeletal:         General: No deformity. Normal range of motion.      Cervical back: Normal range of motion and neck supple.   Lymphadenopathy:      Cervical: No cervical adenopathy.   Skin:     General: Skin is warm and dry.      Findings: No rash.   Neurological:      Mental Status: She is alert and oriented to person, place, and time.      Gait: Gait normal.   Psychiatric:         Speech: Speech normal.         Behavior: Behavior normal.           Assessment:       1. Murmur, cardiac    2. Wellness examination    3. Routine general medical examination at a health care facility    4. Pure hypercholesterolemia    5. Seasonal allergic rhinitis due to pollen    6. Screening for colon cancer         Plan:       Murmur, cardiac  Comments:  diagnosed as child. no issues    Wellness examination  Comments:  labs today. no complaints    Routine general medical examination at a health care facility    Pure hypercholesterolemia  Comments:  diet and exercise. labs    Seasonal allergic rhinitis due to pollen  Comments:  ok for otc antihistamines    Screening for colon cancer  -     Ambulatory referral/consult to Gastroenterology; Future; Expected date: 03/18/2024      Follow up in  about 1 year (around 3/11/2025), or if symptoms worsen or fail to improve, for medication management.        3/11/2024 Sofi Olivares

## 2024-03-12 ENCOUNTER — PATIENT MESSAGE (OUTPATIENT)
Dept: ADMINISTRATIVE | Facility: HOSPITAL | Age: 54
End: 2024-03-12
Payer: COMMERCIAL

## 2024-03-12 LAB
ALBUMIN SERPL-MCNC: 3.8 G/DL (ref 3.6–5.1)
ALBUMIN/GLOB SERPL: 1.6 (CALC) (ref 1–2.5)
ALP SERPL-CCNC: 64 U/L (ref 37–153)
ALT SERPL-CCNC: 12 U/L (ref 6–29)
APPEARANCE UR: CLEAR
AST SERPL-CCNC: 16 U/L (ref 10–35)
BACTERIA #/AREA URNS HPF: ABNORMAL /HPF
BACTERIA UR CULT: ABNORMAL
BASOPHILS # BLD AUTO: 29 CELLS/UL (ref 0–200)
BASOPHILS NFR BLD AUTO: 0.5 %
BILIRUB SERPL-MCNC: 0.4 MG/DL (ref 0.2–1.2)
BILIRUB UR QL STRIP: NEGATIVE
BUN SERPL-MCNC: 9 MG/DL (ref 7–25)
BUN/CREAT SERPL: NORMAL (CALC) (ref 6–22)
CALCIUM SERPL-MCNC: 9 MG/DL (ref 8.6–10.4)
CHLORIDE SERPL-SCNC: 105 MMOL/L (ref 98–110)
CHOLEST SERPL-MCNC: 207 MG/DL
CHOLEST/HDLC SERPL: 2.4 (CALC)
CO2 SERPL-SCNC: 25 MMOL/L (ref 20–32)
COLOR UR: YELLOW
CREAT SERPL-MCNC: 0.85 MG/DL (ref 0.5–1.03)
EGFR: 82 ML/MIN/1.73M2
EOSINOPHIL # BLD AUTO: 228 CELLS/UL (ref 15–500)
EOSINOPHIL NFR BLD AUTO: 4 %
ERYTHROCYTE [DISTWIDTH] IN BLOOD BY AUTOMATED COUNT: 12.6 % (ref 11–15)
GLOBULIN SER CALC-MCNC: 2.4 G/DL (CALC) (ref 1.9–3.7)
GLUCOSE SERPL-MCNC: 82 MG/DL (ref 65–99)
GLUCOSE UR QL STRIP: NEGATIVE
HCT VFR BLD AUTO: 39 % (ref 35–45)
HDLC SERPL-MCNC: 86 MG/DL
HGB BLD-MCNC: 12.3 G/DL (ref 11.7–15.5)
HGB UR QL STRIP: ABNORMAL
HYALINE CASTS #/AREA URNS LPF: ABNORMAL /LPF
KETONES UR QL STRIP: NEGATIVE
LDLC SERPL CALC-MCNC: 104 MG/DL (CALC)
LEUKOCYTE ESTERASE UR QL STRIP: NEGATIVE
LYMPHOCYTES # BLD AUTO: 1721 CELLS/UL (ref 850–3900)
LYMPHOCYTES NFR BLD AUTO: 30.2 %
MCH RBC QN AUTO: 29.4 PG (ref 27–33)
MCHC RBC AUTO-ENTMCNC: 31.5 G/DL (ref 32–36)
MCV RBC AUTO: 93.1 FL (ref 80–100)
MONOCYTES # BLD AUTO: 399 CELLS/UL (ref 200–950)
MONOCYTES NFR BLD AUTO: 7 %
NEUTROPHILS # BLD AUTO: 3323 CELLS/UL (ref 1500–7800)
NEUTROPHILS NFR BLD AUTO: 58.3 %
NITRITE UR QL STRIP: NEGATIVE
NONHDLC SERPL-MCNC: 121 MG/DL (CALC)
PH UR STRIP: 5.5 [PH] (ref 5–8)
PLATELET # BLD AUTO: 253 THOUSAND/UL (ref 140–400)
PMV BLD REES-ECKER: 10.3 FL (ref 7.5–12.5)
POTASSIUM SERPL-SCNC: 4.2 MMOL/L (ref 3.5–5.3)
PROT SERPL-MCNC: 6.2 G/DL (ref 6.1–8.1)
PROT UR QL STRIP: NEGATIVE
RBC # BLD AUTO: 4.19 MILLION/UL (ref 3.8–5.1)
RBC #/AREA URNS HPF: ABNORMAL /HPF
SERVICE CMNT-IMP: ABNORMAL
SODIUM SERPL-SCNC: 141 MMOL/L (ref 135–146)
SP GR UR STRIP: 1.02 (ref 1–1.03)
SQUAMOUS #/AREA URNS HPF: ABNORMAL /HPF
TRIGL SERPL-MCNC: 77 MG/DL
TSH SERPL-ACNC: 1.69 MIU/L
WBC # BLD AUTO: 5.7 THOUSAND/UL (ref 3.8–10.8)
WBC #/AREA URNS HPF: ABNORMAL /HPF

## 2024-05-15 DIAGNOSIS — Z12.31 OTHER SCREENING MAMMOGRAM: ICD-10-CM

## 2024-05-20 ENCOUNTER — PATIENT MESSAGE (OUTPATIENT)
Dept: ADMINISTRATIVE | Facility: HOSPITAL | Age: 54
End: 2024-05-20
Payer: COMMERCIAL

## 2024-09-20 ENCOUNTER — HOSPITAL ENCOUNTER (OUTPATIENT)
Dept: RADIOLOGY | Facility: HOSPITAL | Age: 54
Discharge: HOME | End: 2024-09-20
Attending: NURSE PRACTITIONER
Payer: COMMERCIAL

## 2024-09-20 VITALS — HEIGHT: 69 IN | BODY MASS INDEX: 25.48 KG/M2 | WEIGHT: 172 LBS

## 2024-09-20 DIAGNOSIS — Z12.31 OTHER SCREENING MAMMOGRAM: ICD-10-CM

## 2024-09-20 PROCEDURE — 77063 BREAST TOMOSYNTHESIS BI: CPT | Mod: TC,PO

## 2024-09-20 PROCEDURE — 77063 BREAST TOMOSYNTHESIS BI: CPT | Mod: 26,,, | Performed by: RADIOLOGY

## 2024-09-20 PROCEDURE — 77067 SCR MAMMO BI INCL CAD: CPT | Mod: 26,,, | Performed by: RADIOLOGY

## 2024-09-20 PROCEDURE — 77067 SCR MAMMO BI INCL CAD: CPT | Mod: TC,PO

## 2024-09-25 ENCOUNTER — TELEPHONE (OUTPATIENT)
Dept: GASTROENTEROLOGY | Facility: CLINIC | Age: 54
End: 2024-09-25
Payer: COMMERCIAL

## 2024-09-25 NOTE — TELEPHONE ENCOUNTER
----- Message from Alem Howard sent at 9/25/2024 10:32 AM CDT -----  Type: Needs Medical Advice  Who Called:  Abbi  Symptoms (please be specific):  Z12.11 (ICD-10-CM) - Screening for colon cancer  Best Call Back Number: [phone]   Additional Information: patient is calling to schedule for colonoscopy

## 2024-12-04 ENCOUNTER — TELEPHONE (OUTPATIENT)
Dept: GASTROENTEROLOGY | Facility: CLINIC | Age: 54
End: 2024-12-04
Payer: COMMERCIAL

## 2024-12-04 NOTE — TELEPHONE ENCOUNTER
----- Message from Lyssa sent at 12/4/2024 12:44 PM CST -----  Regarding: time of procedure  Contact: pt  Type:  Needs Medical Advice    Who Called: pt    Would the patient rather a call back or a response via [facility]? Call back    Best Call Back Number: [phone]  or portal    Additional Information: pt requesting time of procedure on 12/9  pt needs to arrange transportation.

## 2024-12-08 ENCOUNTER — NURSE TRIAGE (OUTPATIENT)
Dept: ADMINISTRATIVE | Facility: CLINIC | Age: 54
End: 2024-12-08
Payer: COMMERCIAL

## 2024-12-08 NOTE — TELEPHONE ENCOUNTER
Pt called in stating that she is doing colonoscopy prep- has taken the ducolax x 2 doses. Concerned that she has not had much result at this point.Pt advised it takes 6-12 hours for ducolax to work. Also advised she has additional steps to her prep and will probably start having results after taking Mag Citrate. Pt asking if she can start taking Mag citrate early (now). Advised ok to start early. Pt has no additional concerns or questions at this time.   Reason for Disposition   Health information question, no triage required and triager able to answer question    Additional Information   Negative: Triager needs further essential information from caller in order to complete triage   Negative: [1] Caller requesting NON-URGENT health information AND [2] PCP's office is the best resource   Negative: Requesting regular office appointment    Protocols used: Information Only Call - No Triage-A-

## 2024-12-10 ENCOUNTER — TELEPHONE (OUTPATIENT)
Dept: GASTROENTEROLOGY | Facility: CLINIC | Age: 54
End: 2024-12-10
Payer: COMMERCIAL

## 2024-12-10 DIAGNOSIS — D49.0 COLON TUMOR: Primary | ICD-10-CM

## 2024-12-10 NOTE — TELEPHONE ENCOUNTER
Spoke with pt. Scheduled CT scan with pt. Pt will do lab tomorrow as well. Pt informed Dr. Brandt's office to call to schedule appointment. Pt verbalized understanding to all.

## 2024-12-10 NOTE — TELEPHONE ENCOUNTER
I informed pt of sigmoid colon mass biopsy results (adenocarcinoma).  Pt is scheduled for CT scan and CEA tomorrow, with CRS consultation with Dr Brandt on 12/18.

## 2024-12-10 NOTE — TELEPHONE ENCOUNTER
----- Message from Zora sent at 12/10/2024  4:05 PM CST -----  Type:  Test Results      Who Called: pt    Name of Test (Lab/Mammo/Etc): Colonoscopy Biopsy    Date of Test: 12/9    Ordering Provider: Rowan    Where the test was performed: [facility]    Would the patient rather a call back or a response via [facility]? Call back    Best Call Back Number: [phone]      Additional Information:  pt said results are uploaded to her chart and she is asking for someone to call and go over them with her     Please call Back to advise. Thanks!

## 2024-12-11 ENCOUNTER — HOSPITAL ENCOUNTER (OUTPATIENT)
Dept: RADIOLOGY | Facility: HOSPITAL | Age: 54
Discharge: HOME | End: 2024-12-11
Attending: INTERNAL MEDICINE
Payer: COMMERCIAL

## 2024-12-11 DIAGNOSIS — D49.0 COLON TUMOR: ICD-10-CM

## 2024-12-11 PROCEDURE — 74177 CT ABD & PELVIS W/CONTRAST: CPT | Mod: 26,,, | Performed by: RADIOLOGY

## 2024-12-11 PROCEDURE — 25500020 PHARM REV CODE 255

## 2024-12-11 PROCEDURE — 74177 CT ABD & PELVIS W/CONTRAST: CPT | Mod: TC

## 2024-12-11 RX ADMIN — IOHEXOL 30 ML: 300 INJECTION, SOLUTION INTRAVENOUS at 07:12

## 2024-12-11 RX ADMIN — IOHEXOL 75 ML: 350 INJECTION, SOLUTION INTRAVENOUS at 07:12

## 2024-12-12 ENCOUNTER — TELEPHONE (OUTPATIENT)
Dept: GASTROENTEROLOGY | Facility: CLINIC | Age: 54
End: 2024-12-12
Payer: COMMERCIAL

## 2024-12-12 DIAGNOSIS — K76.9 LIVER DISEASE, UNSPECIFIED: Primary | ICD-10-CM

## 2024-12-12 DIAGNOSIS — D49.0 COLON TUMOR: Primary | ICD-10-CM

## 2024-12-12 NOTE — TELEPHONE ENCOUNTER
----- Message from Ana sent at 12/12/2024  9:35 AM CST -----  Contact: self  Type:  Needs Medical Advice    Who Called: self  Symptoms (please be specific): pt is needing a lab order for CEA in chart again, she wants to go to [facility]. Please call pt to discuss   Would the patient rather a call back or a response via [facility]? call  Best Call Back Number: [phone] (home)     Additional Information: please advise and thank you.

## 2024-12-12 NOTE — TELEPHONE ENCOUNTER
Spoke with pt. Informed her lab order will be sent to [facility] per her request. Pt asked when will Dr. Donahue go over her CT results with her. Pt informed CT will be discussed with her during her office visit with Dr. Brandt; the point of CT scan was to see if the tumor discovered during her colonoscopy goes beyond the colon wall (which Dr. Brandt needs to know); same for the lab. Pt verbalized understanding of this.     Lab order faxed

## 2024-12-14 LAB — CEA SERPL-MCNC: 1.4 NG/ML (ref 0–4.7)

## 2024-12-18 ENCOUNTER — PATIENT MESSAGE (OUTPATIENT)
Dept: SURGERY | Facility: CLINIC | Age: 54
End: 2024-12-18

## 2024-12-18 ENCOUNTER — OFFICE VISIT (OUTPATIENT)
Dept: SURGERY | Facility: CLINIC | Age: 54
End: 2024-12-18
Payer: COMMERCIAL

## 2024-12-18 ENCOUNTER — HOSPITAL ENCOUNTER (OUTPATIENT)
Dept: RADIOLOGY | Facility: HOSPITAL | Age: 54
Discharge: HOME | End: 2024-12-18
Attending: COLON & RECTAL SURGERY
Payer: COMMERCIAL

## 2024-12-18 VITALS
BODY MASS INDEX: 22 KG/M2 | RESPIRATION RATE: 16 BRPM | SYSTOLIC BLOOD PRESSURE: 110 MMHG | OXYGEN SATURATION: 98 % | HEIGHT: 69 IN | WEIGHT: 148.56 LBS | TEMPERATURE: 97 F | DIASTOLIC BLOOD PRESSURE: 66 MMHG | HEART RATE: 95 BPM

## 2024-12-18 DIAGNOSIS — D49.0 COLON TUMOR: Primary | ICD-10-CM

## 2024-12-18 DIAGNOSIS — D49.0 COLON TUMOR: ICD-10-CM

## 2024-12-18 DIAGNOSIS — C18.7 CANCER OF SIGMOID COLON: Primary | ICD-10-CM

## 2024-12-18 PROCEDURE — 71260 CT THORAX DX C+: CPT | Mod: TC

## 2024-12-18 PROCEDURE — 99999 PR PBB SHADOW E&M-EST. PATIENT-LVL III: CPT | Mod: PBBFAC,,, | Performed by: COLON & RECTAL SURGERY

## 2024-12-18 PROCEDURE — 71260 CT THORAX DX C+: CPT | Mod: 26,,, | Performed by: RADIOLOGY

## 2024-12-18 PROCEDURE — 25500020 PHARM REV CODE 255: Performed by: COLON & RECTAL SURGERY

## 2024-12-18 RX ADMIN — IOHEXOL 100 ML: 350 INJECTION, SOLUTION INTRAVENOUS at 05:12

## 2024-12-18 NOTE — PROGRESS NOTES
HPI:  Abbi Snider is a 54 y.o. female with recent diagnosis of sigmoid colon mass positive for moderately differentiated adenocarcinoma.     2 year hx of rectal bleeding.  No abd pain.  BMs more constipated.  Flat.    No unintended wt loss.  No change in activity levels       MRI Abdomen scheduled 12-      Data Reviewed:   9- Cologuard = Positive     12- Screening colonoscopy (Graeme Donahue MD)  Findings:       A polypoid and ulcerated partially obstructing large mass was found in the distal sigmoid colon (at approx 20-25 cm from anus). The mass was non-circumferential. No bleeding was present. Biopsies were taken with a cold forceps for histology. For location marking, two hemostatic clips were successfully placed immediately DISTAL to lesion.   The exam was otherwise without abnormality to cecum.   Impression:              - Rule out malignancy, partially obstructing tumor in the distal sigmoid colon. Biopsied. Clips were placed.   - The examination was otherwise normal.     PATHOLOGY:  SIGMOID COLON, MASS, BIOPSY:--MODERATELY DIFFERENTIATED ADENOCARCINOMA.   [facility]    12- CT AP  Impression:  1. Poor opacification of the descending colon and sigmoid colon with suggestion of possible 5.6 cm length circumferential colonic wall thickening within the mid sigmoid colon which likely correlates with the patient's recently biopsied mass.  There is an adjacent 17 x 19 mm probable necrotic mesenteric lymph node (series 2, image 118).  2. Hepatic hypodensities, statistically most likely cysts; nevertheless, given the patient's recent diagnosis of colon carcinoma, additional characterization with contrast enhanced MRI of the liver is recommended to confirm the presence of cysts and exclude hepatic metastatic disease.  3. Small fat containing umbilical hernia    12- CEA = 1.4    Past Medical History:   Diagnosis Date    Murmur, cardiac         Past Surgical History:   Procedure  "Laterality Date     SECTION, CLASSIC      COLONOSCOPY N/A 2024    Procedure: COLONOSCOPY;  Surgeon: Graeme Donahue MD;  Location: [facility];  Service: Gastroenterology;  Laterality: N/A;    WISDOM TOOTH EXTRACTION         Review of patient's allergies indicates:  No Known Allergies    Family History   Problem Relation Name Age of Onset    Breast cancer Maternal Grandmother      Cancer Maternal Grandmother         Social History     Socioeconomic History    Marital status:    Tobacco Use    Smoking status: Never    Smokeless tobacco: Never   Substance and Sexual Activity    Alcohol use: Not Currently     Comment: Rare glass of red wine    Drug use: Never       ROS:  GENERAL: No fever, chills, fatigability or weight loss.  Integument: No rashes, redness, icterus  CHEST: Denies AGUIRRE, cyanosis, wheezing, cough and sputum production.  CARDIOVASCULAR: Denies chest pain, PND, orthopnea or reduced exercise tolerance.  GI: Denies abd pain, dysphagia, nausea, vomiting, no hematemesis   : Denies burning on urination, no hematuria, no bacteriuria  MSK: No deformities, swelling, joint pain swelling  Neurologic: No HAs, seizures, weakness, paresthesias, gait problems    PE:  General appearance in NAD  /66 (BP Location: Left arm, Patient Position: Sitting)   Pulse 95   Temp 96.8 °F (36 °C) (Temporal)   Resp 16   Ht 5' 9" (1.753 m)   Wt 67.4 kg (148 lb 9.4 oz)   LMP 2023 (Exact Date)   SpO2 98%   BMI 21.94 kg/m²     Sclera/ Skin anicteric  LN none palpable  AT NC EOMI  Neck supple trachea midline   Chest symmetric, nl excursion, no retractions, breathing comfortably  Abdomen  ND soft NT.  no masses, no organomegaly  EXT - no CCE  Neuro:  Mood/ affect nl, alert and oriented x 3, moves all ext's, gait nl    Rectal  Inspection   JENNIFER nl tone, no mass      Assessment:  Sigmoid colon cancer  Hepatic cysts  Good performance status    Plan:  MRI abdomen  CT chest to complete staging.  The " patient I discussed the findings of the colonoscopy and biopsy results.    The indications for surgical resection have been explained to the patient.  We discussed the details of the procedure, expected hospital course and recuperation, as well as the expected return to regular activities.  We specifically discussed return to work.  The functional consequences of  sigmoid colectomy were explained. The risks of bleeding, need for transfusion, risks of transfusion, infection, ileus, anastomotic leak, ureteral injury, reoperation and ostomy creation were discussed.

## 2024-12-19 ENCOUNTER — TELEPHONE (OUTPATIENT)
Dept: RADIOLOGY | Facility: HOSPITAL | Age: 54
End: 2024-12-19

## 2024-12-19 ENCOUNTER — TELEPHONE (OUTPATIENT)
Dept: SURGERY | Facility: CLINIC | Age: 54
End: 2024-12-19
Payer: COMMERCIAL

## 2024-12-19 NOTE — TELEPHONE ENCOUNTER
RN returned patient's call -- pt states she is breaking out in hives from the iv contrast from ct scan on 12/18. Pt states she took oral benadryl -- pt denies shortness of breath, trouble swallowing -- advised pt to report to ER if she experiences any sob or trouble swallowing. Pt voiced understanding.     Pt voiced concern with scheduled mri on 12/21 -- Dr Donahue's office notified via routed message and secure chat with JORY Chen LPN of the need for premedication for scan due to contrast allergy    ----- Message from [employer] sent at 12/19/2024  2:43 PM CST -----  Type: Needs Medical Advice  Who Called:  zion  Best Call Back Number: [phone]    Additional Information: zion is wanting to speak to nurse about having a  rash with contrast  dye and  is suppose to have a mri Saturday  and is  wanting to talk to nurse about other issues  and her procedure  coming up , please call to further discuss thank you .

## 2024-12-20 ENCOUNTER — TELEPHONE (OUTPATIENT)
Dept: SURGERY | Facility: CLINIC | Age: 54
End: 2024-12-20
Payer: COMMERCIAL

## 2024-12-20 ENCOUNTER — TELEPHONE (OUTPATIENT)
Dept: GASTROENTEROLOGY | Facility: CLINIC | Age: 54
End: 2024-12-20
Payer: COMMERCIAL

## 2024-12-20 ENCOUNTER — NURSE TRIAGE (OUTPATIENT)
Dept: ADMINISTRATIVE | Facility: CLINIC | Age: 54
End: 2024-12-20
Payer: COMMERCIAL

## 2024-12-20 DIAGNOSIS — D49.0 COLON TUMOR: Primary | ICD-10-CM

## 2024-12-20 RX ORDER — CIPROFLOXACIN 500 MG/1
500 TABLET ORAL 2 TIMES DAILY
Qty: 2 TABLET | Refills: 0 | Status: SHIPPED | OUTPATIENT
Start: 2024-12-20

## 2024-12-20 RX ORDER — POLYETHYLENE GLYCOL 3350, SODIUM SULFATE ANHYDROUS, SODIUM BICARBONATE, SODIUM CHLORIDE, POTASSIUM CHLORIDE 236; 22.74; 6.74; 5.86; 2.97 G/4L; G/4L; G/4L; G/4L; G/4L
4 POWDER, FOR SOLUTION ORAL ONCE
Qty: 4000 ML | Refills: 0 | Status: SHIPPED | OUTPATIENT
Start: 2024-12-20 | End: 2024-12-20

## 2024-12-20 RX ORDER — METRONIDAZOLE 500 MG/1
500 TABLET ORAL 2 TIMES DAILY
Qty: 2 TABLET | Refills: 0 | Status: SHIPPED | OUTPATIENT
Start: 2024-12-20

## 2024-12-20 NOTE — TELEPHONE ENCOUNTER
"Pt states bad reaction to CT contrast, "done day before yesterday." Pt reports hives, not improved after Benadryl. Denies trouble breathing. Per protocol, see physician within 24 hours. Discussed OTC meds and pt accepted [facility] visit. Advised to call back for any further questions or concerns.   Reason for Disposition   [1] MODERATE-SEVERE hives (e.g.,hives interfere with normal activities or work) AND [2] not improved after taking antihistamine (e.g., cetirizine, fexofenadine, or loratadine) > 24 hours    Additional Information   Negative: [1] Life-threatening reaction (anaphylaxis) in the past to similar substance (e.g., food, insect bite/sting, chemical, etc.) AND [2] < 2 hours since exposure   Negative: Difficulty breathing or wheezing now   Negative: [1] Swollen tongue AND [2] rapid onset   Negative: [1] Hoarseness or cough now AND [2] rapid onset   Negative: Shock suspected (e.g., cold/pale/clammy skin, too weak to stand, low BP, rapid pulse)   Negative: Difficult to awaken or acting confused (e.g., disoriented, slurred speech)   Negative: Sounds like a life-threatening emergency to the triager   Negative: Swollen tongue   Negative: [1] Widespread hives, itching or facial swelling AND [2] onset < 2 hours of exposure to high-risk allergen (e.g., sting, nuts, 1st dose of antibiotic)   Negative: Patient sounds very sick or weak to the triager    Protocols used: Hives-MARSHALL    "

## 2024-12-20 NOTE — TELEPHONE ENCOUNTER
----- Message from Coretta sent at 12/20/2024  9:47 AM CST -----  Regarding: advice  Contact: patient  Type: Needs Medical Advice  Who Called:  patient  Symptoms (please be specific):    How long has patient had these symptoms:    Pharmacy name and phone #:    Best Call Back Number: [phone]  Additional Information: Patient states even if she still has the rash can she still have her operation?  Please call patient to advise.  Thanks!

## 2024-12-20 NOTE — TELEPHONE ENCOUNTER
"Received a message from Katia Mota RN on pt stating she had a reaction (rash) to recent CT with contrast. Message was being sent as she is scheduled for an MRI Abdomen W WO contrast ordered by Dr. Donahue and wanted to make sure we were aware in case pt needed to be pre medicated prior. Per Dr. Donahue Prednisone 50 mg x 3 doses (13, 7, and 1 hour before scan) with 2 OTC benadryl with last dose was called to pts pharmacy. Spoke to radiology at [facility] (where MRI is scheduled) and they said it was unlikely she would have a reaction as MRI and CT contrast are different, but that they could not promise. Spoke to pt and pt states the rash is worsening and she went to urgent care this morning where she was given a steroid injection and is "not doing good." Asked pt if she was still taking OTC benadryl for the rash, pt stated no, she stopped it as she didn't think she needed it, and according to pt it was not mentioned by urgent care staff. Advised pt to continue taking benadryl for rash. Pt states she rescheduled her MRI to 1/18 given her rash. Pt attempting to get ahold of Dr. Brandt office, as she has a procedure scheduled with him this week and has concerns. Advised pt I would send a message to Dr. Brandt office. Pt verbalized understanding. Spoke with Katia Mota RN and she states she will contact pt.   " No

## 2024-12-20 NOTE — TELEPHONE ENCOUNTER
----- Message from Naa sent at 12/20/2024  9:27 AM CST -----  Contact: self  Type:  Needs Medical Advice    Who Called: self   Symptoms (please be specific): is needing to speak to nurse Katia regarding a really bad rash from a CT scan dye, pt went to urgent care and is not seeing results from what they prescribed. Please call pt.     Would the patient rather a call back or a response via [facility]? call  Best Call Back Number: [phone]    Additional Information: please advise and thank you.

## 2024-12-23 ENCOUNTER — TELEPHONE (OUTPATIENT)
Dept: FAMILY MEDICINE | Facility: CLINIC | Age: 54
End: 2024-12-23
Payer: COMMERCIAL

## 2024-12-23 NOTE — TELEPHONE ENCOUNTER
----- Message from Flores sent at 12/23/2024  7:36 AM CST -----  - 12/20-12:17 pm- pt is having an allergic reaction to the ct contrast.   [phone]

## 2024-12-23 NOTE — TELEPHONE ENCOUNTER
Patient went to urgent care - having surgery today so not calling patient. Sending portal message

## 2024-12-24 PROBLEM — Z90.49 S/P PARTIAL RESECTION OF COLON: Status: ACTIVE | Noted: 2024-12-24

## 2025-01-03 ENCOUNTER — TELEPHONE (OUTPATIENT)
Dept: GYNECOLOGIC ONCOLOGY | Facility: CLINIC | Age: 55
End: 2025-01-03

## 2025-01-14 ENCOUNTER — OFFICE VISIT (OUTPATIENT)
Dept: HEMATOLOGY/ONCOLOGY | Facility: CLINIC | Age: 55
End: 2025-01-14
Payer: COMMERCIAL

## 2025-01-14 ENCOUNTER — LAB VISIT (OUTPATIENT)
Dept: LAB | Facility: HOSPITAL | Age: 55
End: 2025-01-14
Attending: INTERNAL MEDICINE
Payer: COMMERCIAL

## 2025-01-14 ENCOUNTER — PATIENT MESSAGE (OUTPATIENT)
Dept: HEMATOLOGY/ONCOLOGY | Facility: CLINIC | Age: 55
End: 2025-01-14

## 2025-01-14 VITALS
BODY MASS INDEX: 21.25 KG/M2 | RESPIRATION RATE: 14 BRPM | WEIGHT: 143.5 LBS | HEART RATE: 103 BPM | HEIGHT: 69 IN | SYSTOLIC BLOOD PRESSURE: 102 MMHG | OXYGEN SATURATION: 98 % | TEMPERATURE: 97 F | DIASTOLIC BLOOD PRESSURE: 70 MMHG

## 2025-01-14 DIAGNOSIS — C18.7 CANCER OF SIGMOID COLON: ICD-10-CM

## 2025-01-14 DIAGNOSIS — K76.89 LIVER NODULE: ICD-10-CM

## 2025-01-14 DIAGNOSIS — D64.9 NORMOCYTIC ANEMIA: ICD-10-CM

## 2025-01-14 DIAGNOSIS — C18.7 CANCER OF SIGMOID COLON: Primary | ICD-10-CM

## 2025-01-14 LAB
ALBUMIN SERPL BCP-MCNC: 3.5 G/DL (ref 3.5–5.2)
ALP SERPL-CCNC: 75 U/L (ref 40–150)
ALT SERPL W/O P-5'-P-CCNC: 52 U/L (ref 10–44)
ANION GAP SERPL CALC-SCNC: 9 MMOL/L (ref 8–16)
AST SERPL-CCNC: 36 U/L (ref 10–40)
BILIRUB SERPL-MCNC: 0.3 MG/DL (ref 0.1–1)
BUN SERPL-MCNC: 13 MG/DL (ref 6–20)
CALCIUM SERPL-MCNC: 9.2 MG/DL (ref 8.7–10.5)
CHLORIDE SERPL-SCNC: 105 MMOL/L (ref 95–110)
CO2 SERPL-SCNC: 26 MMOL/L (ref 23–29)
CREAT SERPL-MCNC: 0.8 MG/DL (ref 0.5–1.4)
EST. GFR  (NO RACE VARIABLE): >60 ML/MIN/1.73 M^2
GLUCOSE SERPL-MCNC: 108 MG/DL (ref 70–110)
POTASSIUM SERPL-SCNC: 4.1 MMOL/L (ref 3.5–5.1)
PROT SERPL-MCNC: 6.3 G/DL (ref 6–8.4)
SODIUM SERPL-SCNC: 140 MMOL/L (ref 136–145)

## 2025-01-14 PROCEDURE — 3008F BODY MASS INDEX DOCD: CPT | Mod: CPTII,S$GLB,, | Performed by: INTERNAL MEDICINE

## 2025-01-14 PROCEDURE — 99205 OFFICE O/P NEW HI 60 MIN: CPT | Mod: S$GLB,,, | Performed by: INTERNAL MEDICINE

## 2025-01-14 PROCEDURE — 3078F DIAST BP <80 MM HG: CPT | Mod: CPTII,S$GLB,, | Performed by: INTERNAL MEDICINE

## 2025-01-14 PROCEDURE — 36415 COLL VENOUS BLD VENIPUNCTURE: CPT | Mod: PN | Performed by: INTERNAL MEDICINE

## 2025-01-14 PROCEDURE — 1111F DSCHRG MED/CURRENT MED MERGE: CPT | Mod: CPTII,S$GLB,, | Performed by: INTERNAL MEDICINE

## 2025-01-14 PROCEDURE — 1159F MED LIST DOCD IN RCRD: CPT | Mod: CPTII,S$GLB,, | Performed by: INTERNAL MEDICINE

## 2025-01-14 PROCEDURE — 3074F SYST BP LT 130 MM HG: CPT | Mod: CPTII,S$GLB,, | Performed by: INTERNAL MEDICINE

## 2025-01-14 PROCEDURE — 1160F RVW MEDS BY RX/DR IN RCRD: CPT | Mod: CPTII,S$GLB,, | Performed by: INTERNAL MEDICINE

## 2025-01-14 PROCEDURE — 99999 PR PBB SHADOW E&M-EST. PATIENT-LVL III: CPT | Mod: PBBFAC,,, | Performed by: INTERNAL MEDICINE

## 2025-01-14 PROCEDURE — 80053 COMPREHEN METABOLIC PANEL: CPT | Mod: PN | Performed by: INTERNAL MEDICINE

## 2025-01-14 RX ORDER — ONDANSETRON 4 MG/1
4 TABLET, ORALLY DISINTEGRATING ORAL EVERY 6 HOURS PRN
Qty: 60 TABLET | Refills: 3 | Status: SHIPPED | OUTPATIENT
Start: 2025-01-14

## 2025-01-14 RX ORDER — PROMETHAZINE HYDROCHLORIDE 25 MG/1
25 TABLET ORAL EVERY 4 HOURS
Qty: 60 TABLET | Refills: 3 | Status: SHIPPED | OUTPATIENT
Start: 2025-01-14

## 2025-01-14 NOTE — PROGRESS NOTES
PATIENT: Abbi Snider  MRN: [id number]  DATE: 2025      Diagnosis:   1. Cancer of sigmoid colon    2. Liver nodule    3. Normocytic anemia        Chief Complaint: Colon Cancer      Oncologic History:      Oncologic History     Oncologic Treatment     Pathology           Subjective:    Initial History: Ms. Snider is a 54 y.o. female presents with colon cancer.  The patient initially underwent colonoscopy on 2024 for workup of bright red blood per rectum.  Colonoscopy showed a partially obstructing tumor in the distal sigmoid colon.  Pathology from the procedure showed moderately differentiated adenocarcinoma with intact nuclear expression of MLH1, PMS2, MSH2, and MSH6.  CT of the abdomen and pelvis on 2024 showed multiple hepatic hypodensities many which too small to characterize with the largest measuring 10 mm in the lateral segment of the left hepatic lobe; circumferential colonic wall thickening approximately 5.6 cm in length within the sigmoid colon; and a hypodense mesenteric lymph node measuring 17 x 19 mm.  CT chest on 2024 showed no evidence of metastatic disease in the chest.  The patient underwent a robotic hemicolectomy under the care of Dr Brandt on 24 with path showing adenocarcinoma, moderately differentiated, measuring 5.8 x 3.5 x 1.6 cm with invasion through the bowel wall just into the fat, negative margins, 2/24 lymph nodes positive pT3 pN1b.    The patient endorses weight loss and occasional diarrhea.  The patient denies CP, cough, SOB, abdominal pain, nausea, vomiting, constipation.  The patient denies fever, chills, night sweats, new lumps or bumps, easy bruising or bleeding.    Past Medical History:   Past Medical History:   Diagnosis Date    Colon cancer     Murmur, cardiac        Past Surgical HIstory:   Past Surgical History:   Procedure Laterality Date     SECTION, CLASSIC      COLONOSCOPY N/A 2024    Procedure: COLONOSCOPY;  Surgeon:  Graeme Donahue MD;  Location: [facility] ENDO;  Service: Gastroenterology;  Laterality: N/A;    DV5 ROBOTIC COLECTOMY, SIGMOID Left 12/23/2024    Procedure: DV5 ROBOTIC COLECTOMY, SIGMOID W/ERAS;  Surgeon: ETIENNE Brandt MD;  Location: [facility] OR;  Service: Colon and Rectal;  Laterality: Left;    FLEXIBLE SIGMOIDOSCOPY N/A 12/23/2024    Procedure: SIGMOIDOSCOPY, FLEXIBLE;  Surgeon: ETIENNE Brandt MD;  Location: [facility] OR;  Service: Colon and Rectal;  Laterality: N/A;    MOBILIZATION OF SPLENIC FLEXURE N/A 12/23/2024    Procedure: MOBILIZATION, SPLENIC FLEXURE;  Surgeon: ETIENNE Brandt MD;  Location: [facility] OR;  Service: Colon and Rectal;  Laterality: N/A;    WISDOM TOOTH EXTRACTION         Family History:   Family History   Problem Relation Name Age of Onset    Cancer Mother          liver due to transplant med    Breast cancer Maternal Grandmother      Cancer Maternal Grandmother  41        breast cancer       Social History:  reports that she has never smoked. She has never been exposed to tobacco smoke. She has never used smokeless tobacco. She reports that she does not currently use alcohol. She reports that she does not use drugs.    Allergies:  Review of patient's allergies indicates:   Allergen Reactions    Contrast media Rash       Medications:  Current Outpatient Medications   Medication Sig Dispense Refill    multivit-mins no.63/iron/folic (M-VIT ORAL) Take 1 tablet by mouth once daily.      ibuprofen (ADVIL,MOTRIN) 600 MG tablet Take 1 tablet (600 mg total) by mouth 3 (three) times daily. (Patient not taking: Reported on 1/14/2025) 15 tablet 0    ondansetron (ZOFRAN-ODT) 4 MG TbDL Take 1 tablet (4 mg total) by mouth every 6 (six) hours as needed (nausea). 60 tablet 3    oxyCODONE (ROXICODONE) 5 MG immediate release tablet Take 1 tablet (5 mg total) by mouth every 4 (four) hours as needed for Pain. (Patient not taking: Reported on 1/14/2025) 30 tablet 0    promethazine (PHENERGAN) 25 MG tablet Take 1  "tablet (25 mg total) by mouth every 4 (four) hours. 60 tablet 3     No current facility-administered medications for this visit.       Review of Systems   Constitutional:  Positive for unexpected weight change (7-8 pounds). Negative for appetite change, chills, fatigue and fever.   HENT:  Negative for mouth sores, sore throat and trouble swallowing.    Eyes:  Negative for photophobia, pain and visual disturbance.   Respiratory:  Negative for cough, chest tightness and shortness of breath.    Cardiovascular:  Negative for chest pain, palpitations and leg swelling.   Gastrointestinal:  Positive for diarrhea. Negative for abdominal pain, constipation, nausea and vomiting.   Genitourinary:  Negative for difficulty urinating, dysuria and frequency.   Musculoskeletal:  Negative for arthralgias and back pain.   Skin:  Negative for color change and rash.   Neurological:  Negative for dizziness, weakness, light-headedness, numbness and headaches.   Hematological:  Negative for adenopathy. Does not bruise/bleed easily.   Psychiatric/Behavioral:  The patient is not nervous/anxious.        ECOG Performance Status: 0   Objective:      Vitals:   Vitals:    01/14/25 1352   BP: 102/70   BP Location: Left arm   Patient Position: Sitting   Pulse: 103   Resp: 14   Temp: 97.4 °F (36.3 °C)   TempSrc: Oral   SpO2: 98%   Weight: 65.1 kg (143 lb 8.3 oz)   Height: 5' 8.5" (1.74 m)       Physical Exam  Constitutional:       General: She is not in acute distress.     Appearance: She is well-developed. She is not diaphoretic.   HENT:      Head: Normocephalic and atraumatic.   Eyes:      General: No scleral icterus.        Right eye: No discharge.         Left eye: No discharge.   Cardiovascular:      Rate and Rhythm: Normal rate and regular rhythm.      Heart sounds: Normal heart sounds. No murmur heard.     No friction rub. No gallop.   Pulmonary:      Effort: Pulmonary effort is normal. No respiratory distress.      Breath sounds: Normal " breath sounds. No wheezing or rales.   Chest:      Chest wall: No tenderness.   Abdominal:      General: Bowel sounds are normal. There is no distension.      Palpations: Abdomen is soft. There is no mass.      Tenderness: There is no abdominal tenderness. There is no guarding or rebound.   Musculoskeletal:         General: No tenderness. Normal range of motion.   Lymphadenopathy:      Cervical: No cervical adenopathy.      Upper Body:      Right upper body: No supraclavicular or axillary adenopathy.      Left upper body: No supraclavicular or axillary adenopathy.   Skin:     Findings: No erythema or rash.      Comments: Incisions C/D/I   Neurological:      Mental Status: She is alert and oriented to person, place, and time.   Psychiatric:         Behavior: Behavior normal.         Laboratory Data:  Lab Visit on 01/14/2025   Component Date Value Ref Range Status    Sodium 01/14/2025 140  136 - 145 mmol/L Final    Potassium 01/14/2025 4.1  3.5 - 5.1 mmol/L Final    Chloride 01/14/2025 105  95 - 110 mmol/L Final    CO2 01/14/2025 26  23 - 29 mmol/L Final    Glucose 01/14/2025 108  70 - 110 mg/dL Final    BUN 01/14/2025 13  6 - 20 mg/dL Final    Creatinine 01/14/2025 0.8  0.5 - 1.4 mg/dL Final    Calcium 01/14/2025 9.2  8.7 - 10.5 mg/dL Final    Total Protein 01/14/2025 6.3  6.0 - 8.4 g/dL Final    Albumin 01/14/2025 3.5  3.5 - 5.2 g/dL Final    Total Bilirubin 01/14/2025 0.3  0.1 - 1.0 mg/dL Final    Comment: For infants and newborns, interpretation of results should be based  on gestational age, weight and in agreement with clinical  observations.    Premature Infant recommended reference ranges:  Up to 24 hours.............<8.0 mg/dL  Up to 48 hours............<12.0 mg/dL  3-5 days..................<15.0 mg/dL  6-29 days.................<15.0 mg/dL      Alkaline Phosphatase 01/14/2025 75  40 - 150 U/L Final    AST 01/14/2025 36  10 - 40 U/L Final    ALT 01/14/2025 52 (H)  10 - 44 U/L Final    eGFR 01/14/2025 >60.0   >60 mL/min/1.73 m^2 Final    Anion Gap 01/14/2025 9  8 - 16 mmol/L Final         Imaging:     CT Abdomen and Pelvis 12/11/24  There is linear atelectasis versus fibrosis at the lung bases. The visualized lower mediastinum is unremarkable.     There are multiple hepatic hypodensities present, many of which are too small to characterize delete, the largest measuring 10 mm in the lateral segment of the left hepatic lobe on series 2, image 25. Given the patient's provided history of colon carcinoma, additional characterization of these hepatic hypodensities with contrast enhanced MRI of the liver is recommended to confirm the presence of hepatic cysts. The hepatic veins are patent. The portal vein is patent. No intra or extrahepatic biliary dilatation. The gallbladder is unremarkable.     The spleen is unremarkable. The stomach, duodenal C-loop, pancreas, and adrenal glands show no significant abnormalities. The abdominal aorta is not aneurysmal. There is a circumaortic left renal vein as an anatomic variant. No bulky periaortic or retroperitoneal lymphadenopathy.     The kidneys are normal in size, contour, and position without evidence of hydronephrosis, stones, or enhancing renal mass. The ureters are normal in caliber and show no evidence of stones. The urinary bladder is unremarkable. The uterus and ovaries show nothing unusual. No inguinal hernia or inguinal lymphadenopathy.     There is a small fat containing umbilical hernia. The appendix is visualized and shows no inflammatory change. The descending colon and rectosigmoid colon are poorly opacified with contrast material. There is an approximately 5.6 cm length of possible circumferential colonic wall thickening observed within the mid sigmoid colon with 2 surgical clips observed within the colonic lumen along the right lateral colonic wall, possibly endoscopically placed at the time of a recent endoscopic biopsy (series 2, image 126). There is an adjacent 17 x  19 mm hypodense mesenteric nodule present on series 2, image 118, possibly a necrotic mesenteric lymph node. No mesenteric lymphadenopathy, ascites, or peritoneal soft tissue nodule observed elsewhere in the abdomen or pelvis.     There is a levoconvex curvature of the thoracolumbar spine and multilevel degenerative change of the thoracolumbar spine. There is degenerative change of the symphysis pubis.      CT Chest 12/18/24  Heart size is normal. The thoracic aorta is normal in caliber. Normal sized mediastinal lymph nodes are noted, with no pathologic lymphadenopathy identified. Small hypodense nodules are noted in both thyroid lobes, the largest on the right measuring 11 mm.     There are no pulmonary nodules or infiltrates. There is mild linear atelectasis at both lung bases. There are no pleural effusions.     No acute osseous abnormalities are identified.     Images of the upper abdomen demonstrate 2 small hepatic hypodensities, likely hepatic cysts, and mild hepatic steatosis.       Assessment:       1. Cancer of sigmoid colon    2. Liver nodule    3. Normocytic anemia           Plan:     Colon Cancer - the patient has stage IIIB adenocarcinoma of the sigmoid colon pT3 N1b diagnosed on hemicolectomy 12/23/2024   The patient with intact nuclear expression of MLH1, PMS2, MSH2, and MSH6  The patient with nodule seen in the liver on CT abdomen 12/11/2024   Patient undergo MRI abdomen on 01/18/2025 and to follow up after it is completed  Patient treatment discussed including CAPOX for 3 months versus FOLFOX for 6 months  Treatment will depend on results of MRI abdomen  -will obtain pharmacogenomics    Liver Lesions - see above    Normocytic Anemia - Hemoglobin 10.3g/dL on 12/24/24  -Will monitor    Route Chart for Scheduling    Med Onc Chart Routing      Follow up with physician 1 week. Pt needs a CMP and pharmacogenomic panel today.  Pt needs an appt with me next week to review [facility] MRI.   Follow up with [facility]     Infusion scheduling note    Injection scheduling note    Labs    Imaging    Pharmacy appointment    Other referrals                     Dragan Brian MD  [facility]  Hematology and Oncology  [facility]   [address] [place] [address]   O: [phone]  F: [phone]

## 2025-01-15 ENCOUNTER — TELEPHONE (OUTPATIENT)
Dept: RADIOLOGY | Facility: HOSPITAL | Age: 55
End: 2025-01-15

## 2025-01-15 ENCOUNTER — OFFICE VISIT (OUTPATIENT)
Dept: SURGERY | Facility: CLINIC | Age: 55
End: 2025-01-15
Payer: COMMERCIAL

## 2025-01-15 VITALS
BODY MASS INDEX: 20.58 KG/M2 | DIASTOLIC BLOOD PRESSURE: 62 MMHG | HEIGHT: 70 IN | OXYGEN SATURATION: 97 % | SYSTOLIC BLOOD PRESSURE: 102 MMHG | HEART RATE: 107 BPM | TEMPERATURE: 98 F | WEIGHT: 143.75 LBS | RESPIRATION RATE: 16 BRPM

## 2025-01-15 DIAGNOSIS — C18.7 MALIGNANT NEOPLASM OF SIGMOID COLON: Primary | ICD-10-CM

## 2025-01-15 PROCEDURE — 3078F DIAST BP <80 MM HG: CPT | Mod: CPTII,S$GLB,, | Performed by: COLON & RECTAL SURGERY

## 2025-01-15 PROCEDURE — 99999 PR PBB SHADOW E&M-EST. PATIENT-LVL III: CPT | Mod: PBBFAC,,, | Performed by: COLON & RECTAL SURGERY

## 2025-01-15 PROCEDURE — 1159F MED LIST DOCD IN RCRD: CPT | Mod: CPTII,S$GLB,, | Performed by: COLON & RECTAL SURGERY

## 2025-01-15 PROCEDURE — 99024 POSTOP FOLLOW-UP VISIT: CPT | Mod: S$GLB,,, | Performed by: COLON & RECTAL SURGERY

## 2025-01-15 PROCEDURE — 3074F SYST BP LT 130 MM HG: CPT | Mod: CPTII,S$GLB,, | Performed by: COLON & RECTAL SURGERY

## 2025-01-17 DIAGNOSIS — R45.89 ANXIETY ABOUT TREATMENT: Primary | ICD-10-CM

## 2025-01-17 RX ORDER — LORAZEPAM 1 MG/1
TABLET ORAL
Qty: 2 TABLET | Refills: 0 | Status: SHIPPED | OUTPATIENT
Start: 2025-01-17 | End: 2025-01-30 | Stop reason: CLARIF

## 2025-01-18 ENCOUNTER — HOSPITAL ENCOUNTER (OUTPATIENT)
Dept: RADIOLOGY | Facility: HOSPITAL | Age: 55
Discharge: HOME | End: 2025-01-18
Attending: INTERNAL MEDICINE
Payer: COMMERCIAL

## 2025-01-18 DIAGNOSIS — K76.9 LIVER DISEASE, UNSPECIFIED: ICD-10-CM

## 2025-01-18 PROCEDURE — 74183 MRI ABD W/O CNTR FLWD CNTR: CPT | Mod: 26,,, | Performed by: RADIOLOGY

## 2025-01-18 PROCEDURE — 74183 MRI ABD W/O CNTR FLWD CNTR: CPT | Mod: TC

## 2025-01-18 PROCEDURE — 25500020 PHARM REV CODE 255

## 2025-01-18 PROCEDURE — A9585 GADOBUTROL INJECTION: HCPCS

## 2025-01-18 RX ORDER — GADOBUTROL 604.72 MG/ML
INJECTION INTRAVENOUS
Status: COMPLETED
Start: 2025-01-18 | End: 2025-01-18

## 2025-01-18 RX ADMIN — GADOBUTROL 6 ML: 604.72 INJECTION INTRAVENOUS at 01:01

## 2025-01-23 NOTE — PROGRESS NOTES
"HPI:  Abbi Snider is a 54 y.o. female with history of sigmoid colon cancer    2024  robotic sigmoid colectomy   DIAGNOSIS:   24 [facility]     1. RECTOSIGMOID COLON, RESECTION:     - INVASIVE COLONIC ADNOCARCINOMA.     - SEE SYNOPTIC REPORT FOR FURTHER CHARACTERIZATION.     2. PORTION OF PROXIMAL COLON MARGIN "DONUT", RESECTION:     - NO TUMOR SEEN.     3. PORTION OF DISTAL COLON MARGIN "DONUT", RESECTION:     - NO TUMOR SEEN.     COLON AND RECTUM - SURGICAL PATHOLOGY CANCER CASE SUMMARY:     PROCEDURE: Rectosigmoid colon resection   TUMOR LOCATION: Sigmoid   HISTOLOGIC TYPE: Adenocarcinoma   HISTOLOGIC GRADE: Moderately differentiated   TUMOR SIZE: 5.8 x 3.5 x 1.6 cm.   TUMOR EXTENT: Through bowel wall just into fat   MACROSCOPIC TUMOR PERFORATION: Negative   LYMPHATIC AND/OR VASCULAR INVASION: Negative   PERINEURAL INVASION: Negative   TUMOR BUDDING SCORE Bd3 count 10   MARGIN STATUS FOR INVASIVE CARCINOMA: Negative   DISTANCE OF INVASIVE CARCINOMA TO CLOSEST MARGIN: About 1.5 cm.    (specimen + donut) - caudad   MARGIN STATUS FOR NON-INVASIVE TUMOR: Negative   DNA MISMATCH REPAIR STATUS (MMR): Intact (Delta [id number])   REGIONAL LYMPH NODE STATUS:   NUMBER OF LYMPH NODES WITH TUMOR: Two (2)   NUMBER OF LYMPH NODES EXAMINED: Twenty-four (24)   TUMOR DEPOSITS: Negative   PATHOLOGIC STAGE CLASSIFICATION: pT3 pN1b     Doing well minimal pain.  No nausea or vomiting.  No fever.    BMs irregular.  No blood.        Past Medical History:   Diagnosis Date    Colon cancer     Murmur, cardiac         Past Surgical History:   Procedure Laterality Date     SECTION, CLASSIC      COLONOSCOPY N/A 2024    Procedure: COLONOSCOPY;  Surgeon: Graeme Donahue MD;  Location: [facility];  Service: Gastroenterology;  Laterality: N/A;    DV5 ROBOTIC COLECTOMY, SIGMOID Left 2024    Procedure: DV5 ROBOTIC COLECTOMY, SIGMOID W/ERAS;  Surgeon: ETIENNE Brandt MD;  Location: [facility] OR;  Service: Colon and " Rectal;  Laterality: Left;    FLEXIBLE SIGMOIDOSCOPY N/A 12/23/2024    Procedure: SIGMOIDOSCOPY, FLEXIBLE;  Surgeon: ETIENNE Brandt MD;  Location: [facility] OR;  Service: Colon and Rectal;  Laterality: N/A;    MOBILIZATION OF SPLENIC FLEXURE N/A 12/23/2024    Procedure: MOBILIZATION, SPLENIC FLEXURE;  Surgeon: ETIENNE Bradnt MD;  Location: [facility] OR;  Service: Colon and Rectal;  Laterality: N/A;    WISDOM TOOTH EXTRACTION         Review of patient's allergies indicates:   Allergen Reactions    Contrast media Rash       Family History   Problem Relation Name Age of Onset    Cancer Mother          liver due to transplant med    Breast cancer Maternal Grandmother      Cancer Maternal Grandmother  41        breast cancer       Social History     Socioeconomic History    Marital status:    Tobacco Use    Smoking status: Never     Passive exposure: Never    Smokeless tobacco: Never   Substance and Sexual Activity    Alcohol use: Not Currently     Comment: Rare glass of red wine    Drug use: Never    Sexual activity: Not Currently     Social Drivers of Health     Financial Resource Strain: Medium Risk (1/7/2025)    Overall Financial Resource Strain (CARDIA)     Difficulty of Paying Living Expenses: Somewhat hard   Food Insecurity: No Food Insecurity (1/7/2025)    Hunger Vital Sign     Worried About Running Out of Food in the Last Year: Never true     Ran Out of Food in the Last Year: Never true   Transportation Needs: No Transportation Needs (12/23/2024)    TRANSPORTATION NEEDS     Transportation : No   Physical Activity: Insufficiently Active (1/7/2025)    Exercise Vital Sign     Days of Exercise per Week: 3 days     Minutes of Exercise per Session: 20 min   Stress: No Stress Concern Present (1/7/2025)    [employer] [place] [employer] - Occupational Stress Questionnaire     Feeling of Stress : Not at all   Housing Stability: Low Risk  (1/7/2025)    Housing Stability Vital Sign     Unable to Pay for  "Housing in the Last Year: No     Homeless in the Last Year: No       ROS:  GENERAL: No fever, chills, fatigability or weight loss.  Integument: No rashes, redness, icterus  CHEST: Denies AGUIRRE, cyanosis, wheezing, cough and sputum production.  CARDIOVASCULAR: Denies chest pain, PND, orthopnea or reduced exercise tolerance.  GI: Denies abd pain, dysphagia, nausea, vomiting, no hematemesis   : Denies burning on urination, no hematuria, no bacteriuria  MSK: No deformities, swelling, joint pain swelling  Neurologic: No HAs, seizures, weakness, paresthesias, gait problems    PE:  General appearance well  /62 (BP Location: Left arm, Patient Position: Sitting)   Pulse 107   Temp 97.7 °F (36.5 °C) (Temporal)   Resp 16   Ht 5' 9.75" (1.772 m)   Wt 65.2 kg (143 lb 11.8 oz)   LMP 08/21/2023 (Exact Date)   SpO2 97%   BMI 20.77 kg/m²   Sclera/ Skin anicteric  LN none palpable  AT NC EOMI  Neck supple trachea midline   Chest symmetric, nl excursion, no retractions, breathing comfortably  Abdomen  Incisions CDI  ND soft NT.  no masses, no organomegaly  EXT - no CCE  Neuro:  Mood/ affect nl, alert and oriented x 3, moves all ext's, gait nl    Assessment:  Stage III colon CA  Recovering well from surgery      Plan:  Adjuvant chemotherapy  OV 3 months      "

## 2025-01-23 NOTE — PROGRESS NOTES
PATIENT: Abbi Snider  MRN: [id number]  DATE: 1/25/2025      Diagnosis:   1. Cancer of sigmoid colon    2. Normocytic anemia          Chief Complaint: Colon Cancer      Oncologic History:      Oncologic History     Oncologic Treatment     Pathology           Subjective:    Interval History: Ms. Snider is a 54 y.o. female presents with colon cancer.  Since the last clinic visit pharmacogenomic testing showed normal DPYD metabolism.  MRI [facility] on 1/18/25 showed SHARONA.  PT endorses occasional gas pains.  The patient denies CP, cough, SOB, abdominal pain, nausea, vomiting, constipation, diarrhea.  The patient denies fever, chills, night sweats, weight loss, new lumps or bumps, easy bruising or bleeding.    Prior History:  The patient initially underwent colonoscopy on 12/09/2024 for workup of bright red blood per rectum.  Colonoscopy showed a partially obstructing tumor in the distal sigmoid colon.  Pathology from the procedure showed moderately differentiated adenocarcinoma with intact nuclear expression of MLH1, PMS2, MSH2, and MSH6.  CT of the abdomen and pelvis on 12/11/2024 showed multiple hepatic hypodensities many which too small to characterize with the largest measuring 10 mm in the lateral segment of the left hepatic lobe; circumferential colonic wall thickening approximately 5.6 cm in length within the sigmoid colon; and a hypodense mesenteric lymph node measuring 17 x 19 mm.  CT chest on 12/18/2024 showed no evidence of metastatic disease in the chest.  The patient underwent a robotic hemicolectomy under the care of Dr Brandt on 12/23/24 with path showing adenocarcinoma, moderately differentiated, measuring 5.8 x 3.5 x 1.6 cm with invasion through the bowel wall just into the fat, negative margins, 2/24 lymph nodes positive pT3 pN1b.    Past Medical History:   Past Medical History:   Diagnosis Date    Colon cancer     Murmur, cardiac        Past Surgical HIstory:   Past Surgical History:   Procedure  Laterality Date     SECTION, CLASSIC      COLONOSCOPY N/A 2024    Procedure: COLONOSCOPY;  Surgeon: Graeme Donahue MD;  Location: [facility];  Service: Gastroenterology;  Laterality: N/A;    DV5 ROBOTIC COLECTOMY, SIGMOID Left 2024    Procedure: DV5 ROBOTIC COLECTOMY, SIGMOID W/ERAS;  Surgeon: ETIENNE Brandt MD;  Location: [facility] OR;  Service: Colon and Rectal;  Laterality: Left;    FLEXIBLE SIGMOIDOSCOPY N/A 2024    Procedure: SIGMOIDOSCOPY, FLEXIBLE;  Surgeon: ETIENNE Brandt MD;  Location: [facility];  Service: Colon and Rectal;  Laterality: N/A;    MOBILIZATION OF SPLENIC FLEXURE N/A 2024    Procedure: MOBILIZATION, SPLENIC FLEXURE;  Surgeon: ETIENNE Brandt MD;  Location: [facility];  Service: Colon and Rectal;  Laterality: N/A;    WISDOM TOOTH EXTRACTION         Family History:   Family History   Problem Relation Name Age of Onset    Cancer Mother          liver due to transplant med    Breast cancer Maternal Grandmother      Cancer Maternal Grandmother  41        breast cancer       Social History:  reports that she has never smoked. She has never been exposed to tobacco smoke. She has never used smokeless tobacco. She reports that she does not currently use alcohol. She reports that she does not use drugs.    Allergies:  Review of patient's allergies indicates:   Allergen Reactions    Contrast media Rash       Medications:  Current Outpatient Medications   Medication Sig Dispense Refill    ibuprofen (ADVIL,MOTRIN) 600 MG tablet Take 1 tablet (600 mg total) by mouth 3 (three) times daily. 15 tablet 0    LORazepam (ATIVAN) 1 MG tablet Take 1 tablet (1 mg) by mouth 30-60 minutes prior to imaging as needed for anxiety. 2 tablet 0    multivit-mins no.63/iron/folic (M-VIT ORAL) Take 1 tablet by mouth once daily.      ondansetron (ZOFRAN-ODT) 4 MG TbDL Take 1 tablet (4 mg total) by mouth every 6 (six) hours as needed (nausea). 60 tablet 3    oxyCODONE (ROXICODONE) 5 MG immediate  "release tablet Take 1 tablet (5 mg total) by mouth every 4 (four) hours as needed for Pain. 30 tablet 0    promethazine (PHENERGAN) 25 MG tablet Take 1 tablet (25 mg total) by mouth every 4 (four) hours. 60 tablet 3    [START ON 2/2/2025] capecitabine (XELODA) 150 MG tablet Take 8 tablets (1,200 mg total) by mouth 2 (two) times daily Take on days 1-14 of chemotherapy cycle. with 1 other capecitabine prescription for 2,700 mg total. 224 tablet 5    [START ON 2/2/2025] capecitabine (XELODA) 500 MG Tab Take 3 tablets (1,500 mg total) by mouth 2 (two) times daily Take on days 1-14 of chemotherapy cycle. with 1 other capecitabine prescription for 2,700 mg total. 84 tablet 5     No current facility-administered medications for this visit.       Review of Systems   Constitutional:  Negative for chills, fatigue, fever and unexpected weight change.   Respiratory:  Negative for cough and shortness of breath.    Cardiovascular:  Negative for chest pain and palpitations.   Gastrointestinal:  Positive for abdominal pain. Negative for constipation, diarrhea, nausea and vomiting.   Skin:  Negative for rash.   Neurological:  Negative for headaches.   Hematological:  Negative for adenopathy. Does not bruise/bleed easily.       ECOG Performance Status: 0   Objective:      Vitals:   Vitals:    01/24/25 1045   BP: 114/70   BP Location: Right arm   Patient Position: Sitting   Pulse: 100   Resp: 16   Temp: 97.6 °F (36.4 °C)   TempSrc: Temporal   SpO2: 99%   Weight: 65.7 kg (144 lb 13.5 oz)   Height: 5' 9.75" (1.772 m)         Physical Exam  Constitutional:       General: She is not in acute distress.     Appearance: She is well-developed. She is not diaphoretic.   HENT:      Head: Normocephalic and atraumatic.   Cardiovascular:      Rate and Rhythm: Normal rate and regular rhythm.      Heart sounds: Normal heart sounds. No murmur heard.     No friction rub. No gallop.   Pulmonary:      Effort: Pulmonary effort is normal. No respiratory " distress.      Breath sounds: Normal breath sounds. No wheezing or rales.   Chest:      Chest wall: No tenderness.   Abdominal:      General: Bowel sounds are normal. There is no distension.      Palpations: Abdomen is soft. There is no mass.      Tenderness: There is no abdominal tenderness. There is no guarding or rebound.   Lymphadenopathy:      Cervical: No cervical adenopathy.      Upper Body:      Right upper body: No supraclavicular or axillary adenopathy.      Left upper body: No supraclavicular or axillary adenopathy.   Skin:     Findings: No erythema or rash.   Neurological:      Mental Status: She is alert and oriented to person, place, and time.   Psychiatric:         Behavior: Behavior normal.         Laboratory Data:  No visits with results within 1 Week(s) from this visit.   Latest known visit with results is:   Lab Visit on 01/14/2025   Component Date Value Ref Range Status    Lab Method 01/14/2025 See Comment   Final    Comment: This test was developed and its performance characteristics determined by [facility], a clinical laboratory located at [address] [place] [address]. These tests have not been cleared or approved by the U.S. Food and Drug   Administration. The FDA does not require this test to go through premarket FDA review.  [facility] [employer] [facility] is certified under CLIA-88 and accredited by the College of American Pathologists as qualified to perform high-complexity testing. This test is used for   clinical purposes and should not be regarded as investigational or for research.  Genomic DNA was analyzed by PCR-based Over 40 Females TaqMan(R) and/or InnaVirVax BHQ(R) probe-based methods to interrogate the variant locations listed in the Test results table above. In addition, CYP2D6 copy number status was assessed at sites within   the promoter, intron 2, intron 6, and exon 9. The test detects CYP2D6 deletions, duplications/multiplications, and hybrid alleles, but cannot  differentiate dupl                           ications in the presence of a deletion.   Haplotypes, or combinations of inherited variants on a chromosome, are annotated according to legacy nomenclature for the genes and alleles in the table below. Less frequent haplotypes or novel alleles may be reported when appropriate.   CY - *1C, *1D, *1E, *1F, *1J, *1K, *1L, *1V, *1W  CYP2B6 - *4, *5, *6, *7, *9, *16, *18  CYP2C9 - *2, *3, *4, *5, *6, *8, *11  MPC1F48 - *2, *3, *4, *4B, *10, *17  CYP2D6 - *2, *2A, *3, *4, *4M, *4N, *5, *6, *6C, *7, *8, *9, *10, *11, *12, *13, *14, *15, *17, *18, *19, *29, *31, *34, *35, *36, *39, 41, *42, *59, *63, *64, *68, *69, *70, *91, *109, *114  CY - *1B, *22  CY - *3, *6, *7  CYP4F2 - *3  DPYD - *2A, *13  VIWN1T0 - *5, *15, *17, *21  TPMT - *2, *3A, *3B, *3C, *4  UGT1A1 - *6, *28  The test does not detect all known and unknown variations in the genes tested, nor does absence of a detectable variant (designated as *1 for genes encoding drug metabolizing enzymes) rule out the presence of othe                           r, non-detected variants.   As with other common SNP genotyping techniques, these assays cannot differentiate between the maternal and paternal chromosomes. In cases where observed variants are associated with more than one haplotype, [facility] infers and reports the most likely   diplotype based on published allele frequency and/or ethnicity data. Inferences with potential clinical impact are reported in the Report and laboratory comments section.   The variant detection methods validated by [facility] provide >99.9% accuracy; however, PCR may be subject to general interference by factors such as reaction inhibitors and low quality or quantity of extracted DNA. When present, these interferents typically   yield no result rather than an inaccurate one. Very infrequent mutations or polymorphisms occurring in primer- or probe-binding regions may also affect testing and  "could produce an erroneous result or assay failure. Variant locations tested by the assay   but not assigned a genotype call ar                           e reported as "No Call." Test results and clinical interpretation may be inaccurate for individuals who have undergone or are receiving non-autologous blood transfusions, tissue, and/or organ transplant therapies.   Although extremely rare, results could also be impacted by other factors not addressed above, such as laboratory error.  Due to the complexity of interpreting some genetic test results, such as those that may carry a probabilistic risk of disease, patients and providers should consider the benefits of consulting with a trained genetic counseling professional, physician, or   pharmacogenomic specialist.      Lab Comment 01/14/2025 See Comment   Final    Sodium 01/14/2025 140  136 - 145 mmol/L Final    Potassium 01/14/2025 4.1  3.5 - 5.1 mmol/L Final    Chloride 01/14/2025 105  95 - 110 mmol/L Final    CO2 01/14/2025 26  23 - 29 mmol/L Final    Glucose 01/14/2025 108  70 - 110 mg/dL Final    BUN 01/14/2025 13  6 - 20 mg/dL Final    Creatinine 01/14/2025 0.8  0.5 - 1.4 mg/dL Final    Calcium 01/14/2025 9.2  8.7 - 10.5 mg/dL Final    Total Protein 01/14/2025 6.3  6.0 - 8.4 g/dL Final    Albumin 01/14/2025 3.5  3.5 - 5.2 g/dL Final    Total Bilirubin 01/14/2025 0.3  0.1 - 1.0 mg/dL Final    Comment: For infants and newborns, interpretation of results should be based  on gestational age, weight and in agreement with clinical  observations.    Premature Infant recommended reference ranges:  Up to 24 hours.............<8.0 mg/dL  Up to 48 hours............<12.0 mg/dL  3-5 days..................<15.0 mg/dL  6-29 days.................<15.0 mg/dL      Alkaline Phosphatase 01/14/2025 75  40 - 150 U/L Final    AST 01/14/2025 36  10 - 40 U/L Final    ALT 01/14/2025 52 (H)  10 - 44 U/L Final    eGFR 01/14/2025 >60.0  >60 mL/min/1.73 m^2 Final    Anion Gap 01/14/2025 9 "  8 - 16 mmol/L Final         Imaging:     MRI Abdomen 1/18/25    The liver is normal in size and contour.  A few tiny cysts are scattered about the hepatic parenchyma.  There are no suspicious hepatic lesions.     The pancreas, spleen, adrenal glands, and kidneys are unremarkable.  The patient's known sigmoid mass is partially visualized on some sequences.       Assessment:       1. Cancer of sigmoid colon    2. 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 diagnosed on hemicolectomy 12/23/2024   -The patient with intact nuclear expression of MLH1, PMS2, MSH2, and MSH6  -The patient with nodule seen in the liver on CT abdomen 12/11/2024   -MRI abdomen 1/18/25 showed cysts in the liver with no concern for mets  -Pharmacogenomic panel showed normal DPYD metabolism  -Pt states she would prefer CAPOX for 3 months  -Prescription of Capecitabine sent to [facility] with dosing 850mg/mm2 twice daily for 14/21 day cycle  -PT to see general surgery for PORT placement  -Chemo care companion to be arranged   Visit today included increased complexity associated with the care of the episodic problem (chemotherapy) addressed and managing the longitudinal care of the patient due to the serious and/or complex managed problem(s) colon cancer.    Normocytic Anemia - Hemoglobin 10.3g/dL on 12/24/24  -Will monitor    Route Chart for Scheduling    Med Onc Chart Routing      Follow up with physician Other. PT needs approval for chemo.  Pt needs a chemo class and chemo care companion set up.  Pt needs appt with general surgery for PORT palcement.  Pt needs a CBC, CMP and Mg with appt with me the day priro to chemo start.   Follow up with [facility]    Infusion scheduling note    Injection scheduling note    Labs    Imaging    Pharmacy appointment    Other referrals              Treatment Plan Information   OP COLORECTAL CAPECITABINE OXALIPLATIN Dragan  LEO Brian MD   Associated diagnosis: Cancer of sigmoid colon Stage IIIB pT3, pN1b, cM0 noted on 12/18/2024   Line of treatment: Adjuvant  Treatment Goal: Curative     Upcoming Treatment Dates - OP COLORECTAL CAPECITABINE OXALIPLATIN    2/3/2025       Pre-Medications       palonosetron 0.25mg/dexAMETHasone 12mg in NS IVPB 0.25 mg 50 mL       Chemotherapy       oxaliplatin (ELOXATIN) 130 mg/m2 = 234 mg in D5W 546.8 mL chemo infusion  2/24/2025       Pre-Medications       palonosetron 0.25mg/dexAMETHasone 12mg in NS IVPB 0.25 mg 50 mL       Chemotherapy       oxaliplatin (ELOXATIN) 130 mg/m2 = 234 mg in D5W 546.8 mL chemo infusion  3/17/2025       Pre-Medications       palonosetron 0.25mg/dexAMETHasone 12mg in NS IVPB 0.25 mg 50 mL       Chemotherapy       oxaliplatin (ELOXATIN) 130 mg/m2 = 234 mg in D5W 546.8 mL chemo infusion  4/7/2025       Pre-Medications       palonosetron 0.25mg/dexAMETHasone 12mg in NS IVPB 0.25 mg 50 mL       Chemotherapy       oxaliplatin (ELOXATIN) 130 mg/m2 = 234 mg in D5W 546.8 mL chemo infusion      Dragan Brian MD  [facility]  Hematology and Oncology  [facility] [address] [place] [address]   O: [phone]  F: [phone]

## 2025-01-24 ENCOUNTER — TELEPHONE (OUTPATIENT)
Dept: HEMATOLOGY/ONCOLOGY | Facility: CLINIC | Age: 55
End: 2025-01-24

## 2025-01-24 ENCOUNTER — OFFICE VISIT (OUTPATIENT)
Dept: HEMATOLOGY/ONCOLOGY | Facility: CLINIC | Age: 55
End: 2025-01-24
Payer: COMMERCIAL

## 2025-01-24 ENCOUNTER — PATIENT MESSAGE (OUTPATIENT)
Dept: ADMINISTRATIVE | Facility: OTHER | Age: 55
End: 2025-01-24
Payer: COMMERCIAL

## 2025-01-24 VITALS
RESPIRATION RATE: 16 BRPM | BODY MASS INDEX: 20.73 KG/M2 | HEIGHT: 70 IN | WEIGHT: 144.81 LBS | SYSTOLIC BLOOD PRESSURE: 114 MMHG | HEART RATE: 100 BPM | TEMPERATURE: 98 F | DIASTOLIC BLOOD PRESSURE: 70 MMHG | OXYGEN SATURATION: 99 %

## 2025-01-24 DIAGNOSIS — C18.7 CANCER OF SIGMOID COLON: Primary | ICD-10-CM

## 2025-01-24 DIAGNOSIS — D64.9 NORMOCYTIC ANEMIA: ICD-10-CM

## 2025-01-24 LAB
ONEOME COMMENT: NORMAL
ONEOME METHOD: NORMAL

## 2025-01-24 PROCEDURE — 99999 PR PBB SHADOW E&M-EST. PATIENT-LVL IV: CPT | Mod: PBBFAC,,, | Performed by: INTERNAL MEDICINE

## 2025-01-24 PROCEDURE — G2211 COMPLEX E/M VISIT ADD ON: HCPCS | Mod: S$GLB,,, | Performed by: INTERNAL MEDICINE

## 2025-01-24 PROCEDURE — 3008F BODY MASS INDEX DOCD: CPT | Mod: CPTII,S$GLB,, | Performed by: INTERNAL MEDICINE

## 2025-01-24 PROCEDURE — 3078F DIAST BP <80 MM HG: CPT | Mod: CPTII,S$GLB,, | Performed by: INTERNAL MEDICINE

## 2025-01-24 PROCEDURE — 99215 OFFICE O/P EST HI 40 MIN: CPT | Mod: S$GLB,,, | Performed by: INTERNAL MEDICINE

## 2025-01-24 PROCEDURE — 1159F MED LIST DOCD IN RCRD: CPT | Mod: CPTII,S$GLB,, | Performed by: INTERNAL MEDICINE

## 2025-01-24 PROCEDURE — 3074F SYST BP LT 130 MM HG: CPT | Mod: CPTII,S$GLB,, | Performed by: INTERNAL MEDICINE

## 2025-01-24 RX ORDER — CAPECITABINE 150 MG/1
1200 TABLET, FILM COATED ORAL 2 TIMES DAILY
Qty: 224 TABLET | Refills: 5 | Status: SHIPPED | OUTPATIENT
Start: 2025-02-02 | End: 2025-01-25 | Stop reason: DRUGHIGH

## 2025-01-24 RX ORDER — CAPECITABINE 500 MG/1
1500 TABLET, FILM COATED ORAL 2 TIMES DAILY
Qty: 84 TABLET | Refills: 5 | Status: SHIPPED | OUTPATIENT
Start: 2025-02-02 | End: 2025-01-25 | Stop reason: DRUGHIGH

## 2025-01-24 NOTE — PLAN OF CARE
START ON PATHWAY REGIMEN - Colorectal    COS82        Capecitabine (Xeloda)       Oxaliplatin     **Always confirm dose/schedule in your pharmacy ordering system**    Patient Characteristics:  Postoperative without Neoadjuvant Therapy, M0 (Pathologic Staging), Colon, Stage   III, Low Risk (pT1-3, pN1)  Tumor Location: Colon  Therapeutic Status: Postoperative without Neoadjuvant Therapy, M0 (Pathologic   Staging)  AJCC M Category: cM0  AJCC T Category: pT3  AJCC N Category: pN1  AJCC 8 Stage Grouping: IIIB  Intent of Therapy:  Curative Intent, Discussed with Patient

## 2025-01-24 NOTE — PLAN OF CARE
DISCONTINUE ON PATHWAY REGIMEN - Colorectal    COS82        Capecitabine (Xeloda)       Oxaliplatin     **Always confirm dose/schedule in your pharmacy ordering system**    REASON: Other Reason  PRIOR TREATMENT: COS82  TREATMENT RESPONSE: Unable to Evaluate    START ON PATHWAY REGIMEN - Colorectal    COS82        Capecitabine (Xeloda)       Oxaliplatin     **Always confirm dose/schedule in your pharmacy ordering system**    Patient Characteristics:  Postoperative without Neoadjuvant Therapy, M0 (Pathologic Staging), Colon, Stage   III, Low Risk (pT1-3, pN1)  Tumor Location: Colon  Therapeutic Status: Postoperative without Neoadjuvant Therapy, M0 (Pathologic   Staging)  AJCC M Category: cM0  AJCC T Category: pT3  AJCC N Category: pN1  AJCC 8 Stage Grouping: IIIB  Intent of Therapy:  Curative Intent, Discussed with Patient

## 2025-01-24 NOTE — NURSING
Called MsNasrin Tylor to schedule chemo school. LVM with contact information. Requested return call to further discuss.     Referrals for RD/SW placed.

## 2025-01-25 RX ORDER — CAPECITABINE 500 MG/1
1500 TABLET, FILM COATED ORAL 2 TIMES DAILY
Qty: 84 TABLET | Refills: 5 | Status: ACTIVE | OUTPATIENT
Start: 2025-01-25

## 2025-01-27 ENCOUNTER — TELEPHONE (OUTPATIENT)
Dept: HEMATOLOGY/ONCOLOGY | Facility: CLINIC | Age: 55
End: 2025-01-27
Payer: COMMERCIAL

## 2025-01-27 NOTE — TELEPHONE ENCOUNTER
----- Message from Med Assistant Omalley sent at 1/24/2025 11:25 AM CST -----  Follow up with physician Other. PT needs approval for chemo.  Pt needs a chemo class and chemo care companion set up.  Pt needs appt with general surgery for PORT palcement.  Pt needs a CBC, CMP and Mg with appt with me the day priro to chemo start.

## 2025-01-27 NOTE — NURSING
Spoke with patient, scheduled chemotherapy education class, pt verbalized agreement to time/date/location 1/29 at 2pm. Discussed port consult appointment for today, pt is off work this week and will request placement be this week with Dr. Petit. Currently waiting on oral Xeloda to arrive from pharmacy, she states she did speak with them. Folder created for education. Awaiting port placement date and authorization for treatment to schedule chemotherapy infusion appointment, fu appt and labs.  Will continue to follow.

## 2025-01-28 ENCOUNTER — DOCUMENTATION ONLY (OUTPATIENT)
Dept: HEMATOLOGY/ONCOLOGY | Facility: CLINIC | Age: 55
End: 2025-01-28
Payer: COMMERCIAL

## 2025-01-28 NOTE — NURSING
Reviewed chart. Awaiting treatment auth, messaged prior auth to request expedited auth. Port placement scheduled 2/1 with Dr. Petit at [facility]. Will continue to follow. Chemo folder placed on 7write desk for 1/29 chemo class

## 2025-01-29 ENCOUNTER — CLINICAL SUPPORT (OUTPATIENT)
Dept: HEMATOLOGY/ONCOLOGY | Facility: CLINIC | Age: 55
End: 2025-01-29
Payer: COMMERCIAL

## 2025-01-29 ENCOUNTER — DOCUMENTATION ONLY (OUTPATIENT)
Dept: INFUSION THERAPY | Facility: HOSPITAL | Age: 55
End: 2025-01-29
Payer: COMMERCIAL

## 2025-01-29 VITALS
HEIGHT: 70 IN | TEMPERATURE: 97 F | OXYGEN SATURATION: 99 % | BODY MASS INDEX: 20.67 KG/M2 | WEIGHT: 144.38 LBS | SYSTOLIC BLOOD PRESSURE: 110 MMHG | RESPIRATION RATE: 18 BRPM | HEART RATE: 99 BPM | DIASTOLIC BLOOD PRESSURE: 60 MMHG

## 2025-01-29 DIAGNOSIS — C18.7 CANCER OF SIGMOID COLON: Primary | ICD-10-CM

## 2025-01-29 PROCEDURE — 99215 OFFICE O/P EST HI 40 MIN: CPT | Mod: S$GLB,,, | Performed by: NURSE PRACTITIONER

## 2025-01-29 PROCEDURE — 99999 PR PBB SHADOW E&M-EST. PATIENT-LVL V: CPT | Mod: PBBFAC,,, | Performed by: NURSE PRACTITIONER

## 2025-01-29 RX ORDER — LIDOCAINE AND PRILOCAINE 25; 25 MG/G; MG/G
CREAM TOPICAL
Qty: 30 G | Refills: 2 | Status: CANCELLED | OUTPATIENT
Start: 2025-01-29

## 2025-01-29 RX ORDER — LIDOCAINE AND PRILOCAINE 25; 25 MG/G; MG/G
CREAM TOPICAL
Qty: 30 G | Refills: 2 | Status: SHIPPED | OUTPATIENT
Start: 2025-01-29

## 2025-01-29 NOTE — PROGRESS NOTES
Oncology   Chemotherapy School Education  Abbi Snider   1970    Social Service Education   This is a 54 y.o.female with a medical diagnosis of colon cancer. Pt said she will be getting her port placed soon. She thinks she will be starting next week.      Current Support System: Pt reports she lives alone. She identified her daughter Zora and her [employer] family as her supports.     Met with pt for new pt education. Reviewed role of  during cancer treatment. Educated on role of SW on care team and resources available at the cancer center.     Answered all psychosocial/socioeconomic related questions. Pt is worried about finances. She works with the [employer] and is currently on short term disability. She said she has not worked for 6 weeks after her surgery. She is hoping to be able to work though out her treatment. CARMINA informed pt of the availability of [employer] the financial counselor here who and help answer questions about her insurance coverage for her treatment. SW answered some questions and provided her with [employer] card so she can meet with her. SW encouraged her to inquire into OF[employer] and or payment plans. SW spoke with pt about other resources can explore during treatment.     Patient provided with social contact number and advised to call with questions or make future appointment if further intervention is needed. SW to follow throughout tx.    Jasmin Peterson, THELMA  01/29/2025  3:42 PM

## 2025-01-29 NOTE — PROGRESS NOTES
Oncology Nutrition   New Patient Education  Abbi Snider   1970    Nutrition Education   This is a 54 y.o.female with a medical diagnosis of colon cancer.     Met w/ pt to discuss current nutritional status and nutrition as it relates to cancer and cancer treatment. Pt currently low nutrition risk. Pt s/p partial colon resection 12/23/24 which lead to some minor weight loss (4#) however patient states in the past week she has started eating more like her normal self again. Denies any N/V/D/C.    Wt Readings from Last 10 Encounters:   01/29/25 65.5 kg (144 lb 6.4 oz)   01/27/25 65.7 kg (144 lb 13.5 oz)   01/24/25 65.7 kg (144 lb 13.5 oz)   01/15/25 65.2 kg (143 lb 11.8 oz)   01/14/25 65.1 kg (143 lb 8.3 oz)   12/23/24 66.2 kg (145 lb 15.1 oz)   12/20/24 67.2 kg (148 lb 2.4 oz)   12/18/24 67.4 kg (148 lb 9.4 oz)   12/09/24 65.6 kg (144 lb 10 oz)   09/20/24 78 kg (172 lb)      [x] PMHx reviewed  [x] Labs reviewed    Educated on food safety and common nutrition impact symptoms associated with chemotherapy treatment. Reinforced the importance of good hydration. Handouts provided.    Answered all nutrition related questions.     Patient provided with dietitian contact number and advised to call with questions or make future appointment if further intervention is needed. RD to follow throughout tx PRN.    Franchesca Khan, MS, RD, LDN  01/29/2025  3:35 PM

## 2025-01-29 NOTE — PROGRESS NOTES
Subjective:      Name: Abbi Snider  : 1970  MRN: [id number]    CC:  Chemotherapy education    HPI:   Abbi Snider is a 54 y.o. female presents alone for education for a recent diagnosis of adenocarcinoma of the colon.      Prior History:  The patient initially underwent colonoscopy on 2024 for workup of bright red blood per rectum.  Colonoscopy showed a partially obstructing tumor in the distal sigmoid colon.  Pathology from the procedure showed moderately differentiated adenocarcinoma with intact nuclear expression of MLH1, PMS2, MSH2, and MSH6.  CT of the abdomen and pelvis on 2024 showed multiple hepatic hypodensities many which too small to characterize with the largest measuring 10 mm in the lateral segment of the left hepatic lobe; circumferential colonic wall thickening approximately 5.6 cm in length within the sigmoid colon; and a hypodense mesenteric lymph node measuring 17 x 19 mm.  CT chest on 2024 showed no evidence of metastatic disease in the chest.  The patient underwent a robotic hemicolectomy under the care of Dr Brandt on 24 with path showing adenocarcinoma, moderately differentiated, measuring 5.8 x 3.5 x 1.6 cm with invasion through the bowel wall just into the fat, negative margins,  lymph nodes positive pT3 pN1b.       Oncology History   Cancer of sigmoid colon   2024 Initial Diagnosis    Cancer of sigmoid colon     2025 Cancer Staged    Staging form: Colon and Rectum, AJCC 8th Edition  - Pathologic: Stage IIIB (pT3, pN1b, cM0)     2/3/2025 - 2/3/2025 Chemotherapy    Treatment Summary   Plan Name: OP COLORECTAL CAPECITABINE OXALIPLATIN  Treatment Goal: Curative  Status: Inactive  Start Date:   End Date:   Provider: Dragan Brian MD  Chemotherapy: capecitabine (XELODA) tablet 2,250 mg, 1,250 mg/m2 = 2,250 mg (original dose ), Oral, 2 times daily, 0 of 1 cycle, Start date: --, End date: --  Dose modification: 1,250 mg/m2 (Cycle  0)  oxaliplatin (ELOXATIN) 130 mg/m2 = 234 mg in D5W 546.8 mL chemo infusion, 130 mg/m2 = 234 mg, Intravenous, Clinic/HOD 1 time, 0 of 6 cycles     2/3/2025 -  Chemotherapy    Treatment Summary   Plan Name: OP COLORECTAL CAPECITABINE OXALIPLATIN Q3W  Treatment Goal: Curative  Status: Active  Start Date: 2/3/2025 (Planned)  End Date: 2025 (Planned)  Provider: Dragan Brian MD  Chemotherapy: capecitabine (XELODA) tablet 2,700 mg, 1,500 mg/m2 = 2,700 mg (100 % of original dose 1,500 mg/m2), Oral, 2 times daily, 0 of 1 cycle, Start date: 2025, End date: --  Dose modification: 1,500 mg/m2 (original dose 1,500 mg/m2, Cycle 0)  oxaliplatin (ELOXATIN) 130 mg/m2 = 234 mg in D5W 546.8 mL chemo infusion, 130 mg/m2 = 234 mg, Intravenous, Clinic/HOD 1 time, 0 of 6 cycles            Past Medical History:   Diagnosis Date    Colon cancer     Murmur, cardiac        Past Surgical History:   Procedure Laterality Date     SECTION, CLASSIC      COLONOSCOPY N/A 2024    Procedure: COLONOSCOPY;  Surgeon: Graeme Donahue MD;  Location: [facility];  Service: Gastroenterology;  Laterality: N/A;    DV5 ROBOTIC COLECTOMY, SIGMOID Left 2024    Procedure: DV5 ROBOTIC COLECTOMY, SIGMOID W/ERAS;  Surgeon: ETIENNE Brandt MD;  Location: [facility] OR;  Service: Colon and Rectal;  Laterality: Left;    FLEXIBLE SIGMOIDOSCOPY N/A 2024    Procedure: SIGMOIDOSCOPY, FLEXIBLE;  Surgeon: ETIENNE Brandt MD;  Location: [facility] OR;  Service: Colon and Rectal;  Laterality: N/A;    MOBILIZATION OF SPLENIC FLEXURE N/A 2024    Procedure: MOBILIZATION, SPLENIC FLEXURE;  Surgeon: ETIENNE Brandt MD;  Location: [facility] OR;  Service: Colon and Rectal;  Laterality: N/A;    WISDOM TOOTH EXTRACTION         Family History   Problem Relation Name Age of Onset    Cancer Mother          liver due to transplant med    Breast cancer Maternal Grandmother      Cancer Maternal Grandmother  41        breast cancer       Social History      Socioeconomic History    Marital status:    Tobacco Use    Smoking status: Never     Passive exposure: Never    Smokeless tobacco: Never   Substance and Sexual Activity    Alcohol use: Not Currently     Comment: Rare glass of red wine    Drug use: Never    Sexual activity: Not Currently     Social Drivers of Health     Financial Resource Strain: Medium Risk (1/7/2025)    Overall Financial Resource Strain (CARDIA)     Difficulty of Paying Living Expenses: Somewhat hard   Food Insecurity: No Food Insecurity (1/7/2025)    Hunger Vital Sign     Worried About Running Out of Food in the Last Year: Never true     Ran Out of Food in the Last Year: Never true   Transportation Needs: No Transportation Needs (12/23/2024)    TRANSPORTATION NEEDS     Transportation : No   Physical Activity: Insufficiently Active (1/7/2025)    Exercise Vital Sign     Days of Exercise per Week: 3 days     Minutes of Exercise per Session: 20 min   Stress: No Stress Concern Present (1/7/2025)    [employer] - Occupational Stress Questionnaire     Feeling of Stress : Not at all   Housing Stability: Low Risk  (1/7/2025)    Housing Stability Vital Sign     Unable to Pay for Housing in the Last Year: No     Homeless in the Last Year: No       Review of patient's allergies indicates:   Allergen Reactions    Contrast media Rash       Review of Systems   Eyes:  Negative for visual disturbance.   Cardiovascular:  Negative for chest pain.   Respiratory:  Negative for cough and shortness of breath.    Hematologic/Lymphatic: Negative for adenopathy.   Skin:  Negative for rash.   Musculoskeletal:  Negative for back pain.   Gastrointestinal:  Negative for abdominal pain and diarrhea.   Genitourinary:  Negative for frequency.   Neurological:  Negative for headaches.   Psychiatric/Behavioral:  The patient is not nervous/anxious.      Answers submitted by the patient for this visit:  Review of Systems Questionnaire  "(Submitted on 1/28/2025)  appetite change : No  unexpected weight change: No  mouth sores: No       Objective:     Vitals:    01/29/25 1402   BP: 110/60   BP Location: Left arm   Patient Position: Sitting   Pulse: 99   Resp: 18   Temp: 97.2 °F (36.2 °C)   TempSrc: Temporal   SpO2: 99%   Weight: 65.5 kg (144 lb 6.4 oz)   Height: 5' 9.75" (1.772 m)        Physical Exam  Vitals reviewed.   Constitutional:       General: She is not in acute distress.  HENT:      Head: Normocephalic and atraumatic.   Pulmonary:      Effort: Pulmonary effort is normal.   Neurological:      Mental Status: She is alert and oriented to person, place, and time.   Psychiatric:         Behavior: Behavior normal.         Thought Content: Thought content normal.             Current Outpatient Medications on File Prior to Visit   Medication Sig    capecitabine (XELODA) 500 MG Tab Take 3 tablets (1,500 mg total) by mouth 2 (two) times daily on days 1-14 of a 21 day cycle.    ibuprofen (ADVIL,MOTRIN) 600 MG tablet Take 1 tablet (600 mg total) by mouth 3 (three) times daily.    LORazepam (ATIVAN) 1 MG tablet Take 1 tablet (1 mg) by mouth 30-60 minutes prior to imaging as needed for anxiety.    multivit-mins no.63/iron/folic (M-VIT ORAL) Take 1 tablet by mouth once daily.    ondansetron (ZOFRAN-ODT) 4 MG TbDL Take 1 tablet (4 mg total) by mouth every 6 (six) hours as needed (nausea).    oxyCODONE (ROXICODONE) 5 MG immediate release tablet Take 1 tablet (5 mg total) by mouth every 4 (four) hours as needed for Pain.    promethazine (PHENERGAN) 25 MG tablet Take 1 tablet (25 mg total) by mouth every 4 (four) hours.     No current facility-administered medications on file prior to visit.       CBC:  Lab Results   Component Value Date    WBC 9.75 12/24/2024    HGB 10.3 (L) 12/24/2024    HCT 31.4 (L) 12/24/2024    MCV 92 12/24/2024     12/24/2024         CMP:  Sodium   Date Value Ref Range Status   01/14/2025 140 136 - 145 mmol/L Final     Potassium "   Date Value Ref Range Status   01/14/2025 4.1 3.5 - 5.1 mmol/L Final     Chloride   Date Value Ref Range Status   01/14/2025 105 95 - 110 mmol/L Final     CO2   Date Value Ref Range Status   01/14/2025 26 23 - 29 mmol/L Final     Glucose   Date Value Ref Range Status   01/14/2025 108 70 - 110 mg/dL Final     BUN   Date Value Ref Range Status   01/14/2025 13 6 - 20 mg/dL Final     Creatinine   Date Value Ref Range Status   01/14/2025 0.8 0.5 - 1.4 mg/dL Final     Calcium   Date Value Ref Range Status   01/14/2025 9.2 8.7 - 10.5 mg/dL Final     Total Protein   Date Value Ref Range Status   01/14/2025 6.3 6.0 - 8.4 g/dL Final     Albumin   Date Value Ref Range Status   01/14/2025 3.5 3.5 - 5.2 g/dL Final     Total Bilirubin   Date Value Ref Range Status   01/14/2025 0.3 0.1 - 1.0 mg/dL Final     Comment:     For infants and newborns, interpretation of results should be based  on gestational age, weight and in agreement with clinical  observations.    Premature Infant recommended reference ranges:  Up to 24 hours.............<8.0 mg/dL  Up to 48 hours............<12.0 mg/dL  3-5 days..................<15.0 mg/dL  6-29 days.................<15.0 mg/dL       Alkaline Phosphatase   Date Value Ref Range Status   01/14/2025 75 40 - 150 U/L Final     AST   Date Value Ref Range Status   01/14/2025 36 10 - 40 U/L Final     ALT   Date Value Ref Range Status   01/14/2025 52 (H) 10 - 44 U/L Final     Anion Gap   Date Value Ref Range Status   01/14/2025 9 8 - 16 mmol/L Final     eGFR if    Date Value Ref Range Status   02/09/2022 97 > OR = 60 mL/min/1.73m2 Final     eGFR if non    Date Value Ref Range Status   02/09/2022 84 > OR = 60 mL/min/1.73m2 Final       MRI Abdomen W WO Contrast  Narrative: EXAMINATION:  MRI ABDOMEN W WO CONTRAST    CLINICAL HISTORY:  Liver lesion, > 1cm;  Liver disease, unspecified    TECHNIQUE:  Multiplanar, multisequence MR imaging of the abdomen was performed before and  after intravenous administration of 10 cc Gadavist.    COMPARISON:  None    FINDINGS:  The liver is normal in size and contour.  A few tiny cysts are scattered about the hepatic parenchyma.  There are no suspicious hepatic lesions.    The pancreas, spleen, adrenal glands, and kidneys are unremarkable.  The patient's known sigmoid mass is partially visualized on some sequences.  Impression: No suspicious focal hepatic lesion.    Electronically signed by: Carlos Vasquez MD  Date:    01/21/2025  Time:    08:47     Assessment:       1. Cancer of sigmoid colon         Plan:     Cancer of sigmoid colon       Colon Cancer - the patient has stage IIIB adenocarcinoma of the sigmoid colon pT3 N1b diagnosed on hemicolectomy 12/23/2024   -The patient with intact nuclear expression of MLH1, PMS2, MSH2, and MSH6  -The patient with nodule seen in the liver on CT abdomen 12/11/2024   -MRI abdomen 1/18/25 showed cysts in the liver with no concern for mets  -Pharmacogenomic panel showed normal DPYD metabolism  -Pt states she would prefer CAPOX for 3 months  -Prescription of Capecitabine sent to [facility] with dosing 850mg/mm2 twice daily for 14/21 day cycle  -PT to see general surgery for PORT placement  -Chemo care companion to be arranged   Visit today included increased complexity associated with the care of the episodic problem (chemotherapy) addressed and managing the longitudinal care of the patient due to the serious and/or complex managed problem(s) colon cancer.          1.  Treatment plan of CAPEOX x 3 months discussed with patient:  Capecitabine 2700 mg oral bid on days 1-14/21 day cycle x 6 cycles.  Oxaliplatin IV every 3 weeks x 6 cycles.  2.  Handouts provided on chemotherapy agents.    3.  Discussed the mechanism of action, potential side effects of this treatment.   4.  Dietary modifications discussed.  Detail instructions already provided by dietician.   5.  Chemotherapy Folder supplied with  contact information.   6.  Discussed follow-up with the physician for toxicity monitoring throughout treatment.    7.  Scripts were escribed (Zofran, Phenergan) and explained for home use.       8.  Care companion set up.    9.  Consents signed.      HELGA Hill, MARIANNEP-C  [facility]  60 minutes were spent in coordination of patient's care, record review and counseling.

## 2025-01-31 ENCOUNTER — TELEPHONE (OUTPATIENT)
Dept: INFUSION THERAPY | Facility: HOSPITAL | Age: 55
End: 2025-01-31
Payer: COMMERCIAL

## 2025-01-31 NOTE — TELEPHONE ENCOUNTER
RD received call from patient with questions regarding the timing of taking her pills in relation to food. The directions provided to her were to take Xeloda 12 hours apart and to eat within 30 minutes after taking. Due to patient's normal routine, she is asking if this could be off a little bit or it has to be exact.     GRUPO messaged MARC Choi NP for guidance who stated it is best to follow 's directions to tolerate tx best.    GRUPO called patient back and told her unfortunately she will have to adjust her routine a little bit to make sure the 2 pills per day are taken 12 hours apart and food is consumed within 30 minutes. Pt VU and appreciative of RD help.    Franchesca Khan 01/31/2025 11:02 AM

## 2025-02-06 ENCOUNTER — PATIENT MESSAGE (OUTPATIENT)
Dept: HEMATOLOGY/ONCOLOGY | Facility: CLINIC | Age: 55
End: 2025-02-06
Payer: COMMERCIAL

## 2025-02-06 DIAGNOSIS — C18.7 CANCER OF SIGMOID COLON: Primary | ICD-10-CM

## 2025-02-06 NOTE — PROGRESS NOTES
PATIENT: Abbi Snider  MRN: [id number]  DATE: 2/7/2025      Diagnosis:   1. Cancer of sigmoid colon    2. Normocytic anemia            Chief Complaint: Cancer of sigmoid colon (F/u with labs to start C1)      Oncologic History:      Oncologic History     Oncologic Treatment     Pathology           Subjective:    Interval History: Ms. Snider is a 54 y.o. female presents with colon cancer.  Since the last clinic visit the patient states she feels well.  The patient denies CP, cough, SOB, abdominal pain, nausea, vomiting, constipation, diarrhea.  The patient denies fever, chills, night sweats, weight loss, new lumps or bumps, easy bruising or bleeding.    Prior History:  The patient initially underwent colonoscopy on 12/09/2024 for workup of bright red blood per rectum.  Colonoscopy showed a partially obstructing tumor in the distal sigmoid colon.  Pathology from the procedure showed moderately differentiated adenocarcinoma with intact nuclear expression of MLH1, PMS2, MSH2, and MSH6.  CT of the abdomen and pelvis on 12/11/2024 showed multiple hepatic hypodensities many which too small to characterize with the largest measuring 10 mm in the lateral segment of the left hepatic lobe; circumferential colonic wall thickening approximately 5.6 cm in length within the sigmoid colon; and a hypodense mesenteric lymph node measuring 17 x 19 mm.  CT chest on 12/18/2024 showed no evidence of metastatic disease in the chest.  The patient underwent a robotic hemicolectomy under the care of Dr Brandt on 12/23/24 with path showing adenocarcinoma, moderately differentiated, measuring 5.8 x 3.5 x 1.6 cm with invasion through the bowel wall just into the fat, negative margins, 2/24 lymph nodes positive pT3 pN1b.   Pharmacogenomic testing showed normal DPYD metabolism.  MRI [facility] on 1/18/25 showed SHARONA.    Past Medical History:   Past Medical History:   Diagnosis Date    Colon cancer     Murmur, cardiac     since childhood        Past Surgical HIstory:   Past Surgical History:   Procedure Laterality Date     SECTION, CLASSIC      COLONOSCOPY N/A 2024    Procedure: COLONOSCOPY;  Surgeon: Graeme Donahue MD;  Location: [facility];  Service: Gastroenterology;  Laterality: N/A;    DV5 ROBOTIC COLECTOMY, SIGMOID Left 2024    Procedure: DV5 ROBOTIC COLECTOMY, SIGMOID W/ERAS;  Surgeon: ETIENNE Brandt MD;  Location: [facility];  Service: Colon and Rectal;  Laterality: Left;    FLEXIBLE SIGMOIDOSCOPY N/A 2024    Procedure: SIGMOIDOSCOPY, FLEXIBLE;  Surgeon: ETIENNE Brandt MD;  Location: [facility];  Service: Colon and Rectal;  Laterality: N/A;    INSERTION OF TUNNELED CENTRAL VENOUS CATHETER (CVC) WITH SUBCUTANEOUS PORT N/A 2025    Procedure: [id number];  Surgeon: Terry Petit MD;  Location: [facility];  Service: General;  Laterality: N/A;    MOBILIZATION OF SPLENIC FLEXURE N/A 2024    Procedure: MOBILIZATION, SPLENIC FLEXURE;  Surgeon: ETIENNE Brandt MD;  Location: [facility];  Service: Colon and Rectal;  Laterality: N/A;    WISDOM TOOTH EXTRACTION         Family History:   Family History   Problem Relation Name Age of Onset    Cancer Mother          liver due to transplant med    Heart disease Father      Breast cancer Maternal Grandmother      Cancer Maternal Grandmother  41        breast cancer       Social History:  reports that she has never smoked. She has never been exposed to tobacco smoke. She has never used smokeless tobacco. She reports that she does not currently use alcohol. She reports that she does not use drugs.    Allergies:  Review of patient's allergies indicates:   Allergen Reactions    Contrast media Rash       Medications:  Current Outpatient Medications   Medication Sig Dispense Refill    ibuprofen (ADVIL,MOTRIN) 600 MG tablet Take 1 tablet (600 mg total) by mouth 3 (three) times daily. 15 tablet 0    LIDOcaine-prilocaine (EMLA) cream Apply topically as needed (apply 30  to 60 minutes prior to chemo). 30 g 2    multivit-mins no.63/iron/folic (M-VIT ORAL) Take 1 tablet by mouth once daily.      ondansetron (ZOFRAN-ODT) 4 MG TbDL Take 1 tablet (4 mg total) by mouth every 6 (six) hours as needed (nausea). 60 tablet 3    oxyCODONE (ROXICODONE) 5 MG immediate release tablet Take 1 tablet (5 mg total) by mouth every 4 (four) hours as needed for Pain. 30 tablet 0    promethazine (PHENERGAN) 25 MG tablet Take 1 tablet (25 mg total) by mouth every 4 (four) hours. 60 tablet 3    capecitabine (XELODA) 500 MG Tab Take 3 tablets (1,500 mg total) by mouth 2 (two) times daily on days 1-14 of a 21 day cycle. (Patient not taking: Reported on 2/7/2025) 84 tablet 5     No current facility-administered medications for this visit.     Facility-Administered Medications Ordered in Other Visits   Medication Dose Route Frequency Provider Last Rate Last Admin    alteplase injection 2 mg  2 mg Intra-Catheter PRN Dragan Brian MD        diphenhydrAMINE injection 50 mg  50 mg Intravenous Once PRN Dragan Brian MD        EPINEPHrine (EPIPEN) 0.3 mg/0.3 mL pen injection 0.3 mg  0.3 mg Intramuscular Once PRN Dragan Brian MD        heparin, porcine (PF) 100 unit/mL injection flush 500 Units  500 Units Intravenous PRN Dragan Brian MD        hydrocortisone sodium succinate injection 100 mg  100 mg Intravenous Once PRN Dragan Brian MD        oxaliplatin (ELOXATIN) 130 mg/m2 = 234 mg in D5W 611.8 mL chemo infusion  130 mg/m2 (Treatment Plan Recorded) Intravenous 1 time in Clinic/HOD Dragan Brian .9 mL/hr at 02/07/25 1324 234 mg at 02/07/25 1324    prochlorperazine injection Soln 10 mg  10 mg Intravenous Once PRN Dragan Brian MD        sodium chloride 0.9% flush 10 mL  10 mL Intravenous PRN Dragan Brian MD           Review of Systems   Constitutional:  Negative for chills, fatigue, fever and unexpected weight change.   Respiratory:  Negative for cough and shortness of breath.   "  Cardiovascular:  Negative for chest pain and palpitations.   Gastrointestinal:  Negative for abdominal pain, constipation, diarrhea, nausea and vomiting.   Skin:  Negative for rash.   Neurological:  Negative for headaches.   Hematological:  Negative for adenopathy. Does not bruise/bleed easily.       ECOG Performance Status: 0   Objective:      Vitals:   Vitals:    02/07/25 0937   BP: 90/60   BP Location: Right arm   Patient Position: Sitting   Pulse: 81   Temp: 97.3 °F (36.3 °C)   TempSrc: Temporal   Weight: 66 kg (145 lb 8.1 oz)   Height: 5' 9.75" (1.772 m)           Physical Exam  Constitutional:       General: She is not in acute distress.     Appearance: She is well-developed. She is not diaphoretic.   HENT:      Head: Normocephalic and atraumatic.   Cardiovascular:      Rate and Rhythm: Normal rate and regular rhythm.      Heart sounds: Normal heart sounds. No murmur heard.     No friction rub. No gallop.   Pulmonary:      Effort: Pulmonary effort is normal. No respiratory distress.      Breath sounds: Normal breath sounds. No wheezing or rales.   Chest:      Chest wall: No tenderness.   Abdominal:      General: Bowel sounds are normal. There is no distension.      Palpations: Abdomen is soft. There is no mass.      Tenderness: There is no abdominal tenderness. There is no guarding or rebound.   Lymphadenopathy:      Cervical: No cervical adenopathy.      Upper Body:      Right upper body: No supraclavicular or axillary adenopathy.      Left upper body: No supraclavicular or axillary adenopathy.   Skin:     Findings: No erythema or rash.   Neurological:      Mental Status: She is alert and oriented to person, place, and time.   Psychiatric:         Behavior: Behavior normal.         Laboratory Data:  Lab Visit on 02/07/2025   Component Date Value Ref Range Status    WBC 02/07/2025 9.18  3.90 - 12.70 K/uL Final    RBC 02/07/2025 3.80 (L)  4.00 - 5.40 M/uL Final    Hemoglobin 02/07/2025 11.0 (L)  12.0 - 16.0 " g/dL Final    Hematocrit 02/07/2025 34.4 (L)  37.0 - 48.5 % Final    MCV 02/07/2025 91  82 - 98 fL Final    MCH 02/07/2025 28.9  27.0 - 31.0 pg Final    MCHC 02/07/2025 32.0  32.0 - 36.0 g/dL Final    RDW 02/07/2025 14.9 (H)  11.5 - 14.5 % Final    Platelets 02/07/2025 268  150 - 450 K/uL Final    MPV 02/07/2025 9.4  9.2 - 12.9 fL Final    Immature Granulocytes 02/07/2025 0.2  0.0 - 0.5 % Final    Gran # (ANC) 02/07/2025 6.6  1.8 - 7.7 K/uL Final    Immature Grans (Abs) 02/07/2025 0.02  0.00 - 0.04 K/uL Final    Comment: Mild elevation in immature granulocytes is non specific and   can be seen in a variety of conditions including stress response,   acute inflammation, trauma and pregnancy. Correlation with other   laboratory and clinical findings is essential.      Lymph # 02/07/2025 1.5  1.0 - 4.8 K/uL Final    Mono # 02/07/2025 0.7  0.3 - 1.0 K/uL Final    Eos # 02/07/2025 0.3  0.0 - 0.5 K/uL Final    Baso # 02/07/2025 0.04  0.00 - 0.20 K/uL Final    nRBC 02/07/2025 0  0 /100 WBC Final    Gran % 02/07/2025 72.2  38.0 - 73.0 % Final    Lymph % 02/07/2025 16.6 (L)  18.0 - 48.0 % Final    Mono % 02/07/2025 7.5  4.0 - 15.0 % Final    Eosinophil % 02/07/2025 3.1  0.0 - 8.0 % Final    Basophil % 02/07/2025 0.4  0.0 - 1.9 % Final    Differential Method 02/07/2025 Automated   Final    Sodium 02/07/2025 140  136 - 145 mmol/L Final    Potassium 02/07/2025 4.9  3.5 - 5.1 mmol/L Final    Chloride 02/07/2025 106  95 - 110 mmol/L Final    CO2 02/07/2025 24  23 - 29 mmol/L Final    Glucose 02/07/2025 88  70 - 110 mg/dL Final    BUN 02/07/2025 12  6 - 20 mg/dL Final    Creatinine 02/07/2025 0.7  0.5 - 1.4 mg/dL Final    Calcium 02/07/2025 9.3  8.7 - 10.5 mg/dL Final    Total Protein 02/07/2025 6.7  6.0 - 8.4 g/dL Final    Albumin 02/07/2025 3.6  3.5 - 5.2 g/dL Final    Total Bilirubin 02/07/2025 0.3  0.1 - 1.0 mg/dL Final    Comment: For infants and newborns, interpretation of results should be based  on gestational age, weight  and in agreement with clinical  observations.    Premature Infant recommended reference ranges:  Up to 24 hours.............<8.0 mg/dL  Up to 48 hours............<12.0 mg/dL  3-5 days..................<15.0 mg/dL  6-29 days.................<15.0 mg/dL      Alkaline Phosphatase 02/07/2025 74  40 - 150 U/L Final    AST 02/07/2025 29  10 - 40 U/L Final    ALT 02/07/2025 44  10 - 44 U/L Final    eGFR 02/07/2025 >60.0  >60 mL/min/1.73 m^2 Final    Anion Gap 02/07/2025 10  8 - 16 mmol/L Final    Magnesium 02/07/2025 2.1  1.6 - 2.6 mg/dL Final    Preg Test, Ur 02/07/2025 Negative   Final   Admission on 02/01/2025, Discharged on 02/01/2025   Component Date Value Ref Range Status    WBC 02/01/2025 6.70  3.90 - 12.70 K/uL Final    RBC 02/01/2025 3.78 (L)  4.00 - 5.40 M/uL Final    Hemoglobin 02/01/2025 10.7 (L)  12.0 - 16.0 g/dL Final    Hematocrit 02/01/2025 33.9 (L)  37.0 - 48.5 % Final    MCV 02/01/2025 90  82 - 98 fL Final    MCH 02/01/2025 28.3  27.0 - 31.0 pg Final    MCHC 02/01/2025 31.6 (L)  32.0 - 36.0 g/dL Final    RDW 02/01/2025 14.5  11.5 - 14.5 % Final    Platelets 02/01/2025 265  150 - 450 K/uL Final    MPV 02/01/2025 8.9 (L)  9.2 - 12.9 fL Final    Immature Granulocytes 02/01/2025 0.3  0.0 - 0.5 % Final    Gran # (ANC) 02/01/2025 5.0  1.8 - 7.7 K/uL Final    Immature Grans (Abs) 02/01/2025 0.02  0.00 - 0.04 K/uL Final    Comment: Mild elevation in immature granulocytes is non specific and   can be seen in a variety of conditions including stress response,   acute inflammation, trauma and pregnancy. Correlation with other   laboratory and clinical findings is essential.      Lymph # 02/01/2025 1.1  1.0 - 4.8 K/uL Final    Mono # 02/01/2025 0.3  0.3 - 1.0 K/uL Final    Eos # 02/01/2025 0.1  0.0 - 0.5 K/uL Final    Baso # 02/01/2025 0.03  0.00 - 0.20 K/uL Final    nRBC 02/01/2025 0  0 /100 WBC Final    Gran % 02/01/2025 75.1 (H)  38.0 - 73.0 % Final    Lymph % 02/01/2025 17.0 (L)  18.0 - 48.0 % Final    Mono %  02/01/2025 5.1  4.0 - 15.0 % Final    Eosinophil % 02/01/2025 2.1  0.0 - 8.0 % Final    Basophil % 02/01/2025 0.4  0.0 - 1.9 % Final    Differential Method 02/01/2025 Automated   Final         Imaging:     MRI Abdomen 1/18/25    The liver is normal in size and contour.  A few tiny cysts are scattered about the hepatic parenchyma.  There are no suspicious hepatic lesions.     The pancreas, spleen, adrenal glands, and kidneys are unremarkable.  The patient's known sigmoid mass is partially visualized on some sequences.       Assessment:       1. Cancer of sigmoid colon    2. 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 diagnosed on hemicolectomy 12/23/2024   -The patient with intact nuclear expression of MLH1, PMS2, MSH2, and MSH6  -The patient with nodule seen in the liver on CT abdomen 12/11/2024   -MRI abdomen 1/18/25 showed cysts in the liver with no concern for mets  -Pharmacogenomic panel showed normal DPYD metabolism  -Pt stated she would prefer CAPOX for 3 months  -Capecitabine dosing 850mg/mm2 twice daily for 14/21 day cycle  -Will proceed with cycle 1 today   Visit today included increased complexity associated with the care of the episodic problem (chemotherapy) addressed and managing the longitudinal care of the patient due to the serious and/or complex managed problem(s) colon cancer.    Normocytic Anemia - Hemoglobin 11g/dL on 2/07/25  -Will check B12, folate and iron studies today  -Will monitor    Route Chart for Scheduling    Med Onc Chart Routing      Follow up with physician 6 weeks. Pt needs ferritin, iron/TIBC, B12 and folate from PORT.  Pt needs a CBC, CMP, Urine pregnancy test in 3 weeks with apt with NP rufino day before treatment.  Pt needs rufino same labs and an appt with me in 6 weeks the day before treatment.   Follow up with [facility] 3 weeks.   Infusion scheduling note    Injection scheduling note    Labs    Imaging     Pharmacy appointment    Other referrals              Treatment Plan Information   OP COLORECTAL CAPECITABINE OXALIPLATIN Q3W Dragan Brian MD   Associated diagnosis: Cancer of sigmoid colon Stage IIIB pT3, pN1b, cM0 noted on 12/18/2024   Line of treatment: Adjuvant  Treatment Goal: Curative     Upcoming Treatment Dates - OP COLORECTAL CAPECITABINE OXALIPLATIN Q3W    2/28/2025       Pre-Medications       palonosetron 0.25mg/dexAMETHasone 12mg in NS IVPB 0.25 mg 50 mL       Chemotherapy       oxaliplatin (ELOXATIN) 130 mg/m2 = 234 mg in D5W 546.8 mL chemo infusion  3/21/2025       Pre-Medications       palonosetron 0.25mg/dexAMETHasone 12mg in NS IVPB 0.25 mg 50 mL       Chemotherapy       oxaliplatin (ELOXATIN) 130 mg/m2 = 234 mg in D5W 546.8 mL chemo infusion  4/11/2025       Pre-Medications       palonosetron 0.25mg/dexAMETHasone 12mg in NS IVPB 0.25 mg 50 mL       Chemotherapy       oxaliplatin (ELOXATIN) 130 mg/m2 = 234 mg in D5W 546.8 mL chemo infusion  5/2/2025       Pre-Medications       palonosetron 0.25mg/dexAMETHasone 12mg in NS IVPB 0.25 mg 50 mL       Chemotherapy       oxaliplatin (ELOXATIN) 130 mg/m2 = 234 mg in D5W 546.8 mL chemo infusion      Dragan Brian MD  [facility]  Hematology and Oncology  [facility]   [address] [place] [address]   O: [phone]  F: [phone]

## 2025-02-07 ENCOUNTER — LAB VISIT (OUTPATIENT)
Dept: LAB | Facility: HOSPITAL | Age: 55
End: 2025-02-07
Attending: INTERNAL MEDICINE
Payer: COMMERCIAL

## 2025-02-07 ENCOUNTER — OFFICE VISIT (OUTPATIENT)
Dept: HEMATOLOGY/ONCOLOGY | Facility: CLINIC | Age: 55
End: 2025-02-07
Payer: COMMERCIAL

## 2025-02-07 ENCOUNTER — INFUSION (OUTPATIENT)
Dept: INFUSION THERAPY | Facility: HOSPITAL | Age: 55
End: 2025-02-07
Attending: INTERNAL MEDICINE
Payer: COMMERCIAL

## 2025-02-07 ENCOUNTER — PATIENT MESSAGE (OUTPATIENT)
Dept: HEMATOLOGY/ONCOLOGY | Facility: CLINIC | Age: 55
End: 2025-02-07

## 2025-02-07 VITALS
DIASTOLIC BLOOD PRESSURE: 73 MMHG | RESPIRATION RATE: 18 BRPM | SYSTOLIC BLOOD PRESSURE: 127 MMHG | HEART RATE: 88 BPM | WEIGHT: 145.5 LBS | BODY MASS INDEX: 20.83 KG/M2 | OXYGEN SATURATION: 99 % | HEIGHT: 70 IN | TEMPERATURE: 98 F

## 2025-02-07 VITALS
HEART RATE: 81 BPM | TEMPERATURE: 97 F | DIASTOLIC BLOOD PRESSURE: 60 MMHG | WEIGHT: 145.5 LBS | SYSTOLIC BLOOD PRESSURE: 90 MMHG | HEIGHT: 70 IN | BODY MASS INDEX: 20.83 KG/M2

## 2025-02-07 DIAGNOSIS — D64.9 NORMOCYTIC ANEMIA: ICD-10-CM

## 2025-02-07 DIAGNOSIS — C18.7 CANCER OF SIGMOID COLON: ICD-10-CM

## 2025-02-07 DIAGNOSIS — C18.7 CANCER OF SIGMOID COLON: Primary | ICD-10-CM

## 2025-02-07 LAB
ALBUMIN SERPL BCP-MCNC: 3.6 G/DL (ref 3.5–5.2)
ALP SERPL-CCNC: 74 U/L (ref 40–150)
ALT SERPL W/O P-5'-P-CCNC: 44 U/L (ref 10–44)
ANION GAP SERPL CALC-SCNC: 10 MMOL/L (ref 8–16)
AST SERPL-CCNC: 29 U/L (ref 10–40)
B-HCG UR QL: NEGATIVE
BASOPHILS # BLD AUTO: 0.04 K/UL (ref 0–0.2)
BASOPHILS NFR BLD: 0.4 % (ref 0–1.9)
BILIRUB SERPL-MCNC: 0.3 MG/DL (ref 0.1–1)
BUN SERPL-MCNC: 12 MG/DL (ref 6–20)
CALCIUM SERPL-MCNC: 9.3 MG/DL (ref 8.7–10.5)
CHLORIDE SERPL-SCNC: 106 MMOL/L (ref 95–110)
CO2 SERPL-SCNC: 24 MMOL/L (ref 23–29)
CREAT SERPL-MCNC: 0.7 MG/DL (ref 0.5–1.4)
DIFFERENTIAL METHOD BLD: ABNORMAL
EOSINOPHIL # BLD AUTO: 0.3 K/UL (ref 0–0.5)
EOSINOPHIL NFR BLD: 3.1 % (ref 0–8)
ERYTHROCYTE [DISTWIDTH] IN BLOOD BY AUTOMATED COUNT: 14.9 % (ref 11.5–14.5)
EST. GFR  (NO RACE VARIABLE): >60 ML/MIN/1.73 M^2
FERRITIN SERPL-MCNC: 22 NG/ML (ref 20–300)
FOLATE SERPL-MCNC: 18.4 NG/ML (ref 4–24)
GLUCOSE SERPL-MCNC: 88 MG/DL (ref 70–110)
HCT VFR BLD AUTO: 34.4 % (ref 37–48.5)
HGB BLD-MCNC: 11 G/DL (ref 12–16)
IMM GRANULOCYTES # BLD AUTO: 0.02 K/UL (ref 0–0.04)
IMM GRANULOCYTES NFR BLD AUTO: 0.2 % (ref 0–0.5)
IRON SERPL-MCNC: 48 UG/DL (ref 30–160)
LYMPHOCYTES # BLD AUTO: 1.5 K/UL (ref 1–4.8)
LYMPHOCYTES NFR BLD: 16.6 % (ref 18–48)
MAGNESIUM SERPL-MCNC: 2.1 MG/DL (ref 1.6–2.6)
MCH RBC QN AUTO: 28.9 PG (ref 27–31)
MCHC RBC AUTO-ENTMCNC: 32 G/DL (ref 32–36)
MCV RBC AUTO: 91 FL (ref 82–98)
MONOCYTES # BLD AUTO: 0.7 K/UL (ref 0.3–1)
MONOCYTES NFR BLD: 7.5 % (ref 4–15)
NEUTROPHILS # BLD AUTO: 6.6 K/UL (ref 1.8–7.7)
NEUTROPHILS NFR BLD: 72.2 % (ref 38–73)
NRBC BLD-RTO: 0 /100 WBC
PLATELET # BLD AUTO: 268 K/UL (ref 150–450)
PMV BLD AUTO: 9.4 FL (ref 9.2–12.9)
POTASSIUM SERPL-SCNC: 4.9 MMOL/L (ref 3.5–5.1)
PROT SERPL-MCNC: 6.7 G/DL (ref 6–8.4)
RBC # BLD AUTO: 3.8 M/UL (ref 4–5.4)
SATURATED IRON: 12 % (ref 20–50)
SODIUM SERPL-SCNC: 140 MMOL/L (ref 136–145)
TOTAL IRON BINDING CAPACITY: 388 UG/DL (ref 250–450)
TRANSFERRIN SERPL-MCNC: 262 MG/DL (ref 200–375)
WBC # BLD AUTO: 9.18 K/UL (ref 3.9–12.7)

## 2025-02-07 PROCEDURE — 82728 ASSAY OF FERRITIN: CPT | Performed by: INTERNAL MEDICINE

## 2025-02-07 PROCEDURE — 3074F SYST BP LT 130 MM HG: CPT | Mod: CPTII,S$GLB,, | Performed by: INTERNAL MEDICINE

## 2025-02-07 PROCEDURE — 83921 ORGANIC ACID SINGLE QUANT: CPT | Performed by: INTERNAL MEDICINE

## 2025-02-07 PROCEDURE — 96413 CHEMO IV INFUSION 1 HR: CPT | Mod: PN

## 2025-02-07 PROCEDURE — 96367 TX/PROPH/DG ADDL SEQ IV INF: CPT | Mod: PN

## 2025-02-07 PROCEDURE — G2211 COMPLEX E/M VISIT ADD ON: HCPCS | Mod: S$GLB,,, | Performed by: INTERNAL MEDICINE

## 2025-02-07 PROCEDURE — 80053 COMPREHEN METABOLIC PANEL: CPT | Mod: PN | Performed by: INTERNAL MEDICINE

## 2025-02-07 PROCEDURE — 63600175 PHARM REV CODE 636 W HCPCS: Mod: PN | Performed by: INTERNAL MEDICINE

## 2025-02-07 PROCEDURE — 84466 ASSAY OF TRANSFERRIN: CPT | Performed by: INTERNAL MEDICINE

## 2025-02-07 PROCEDURE — 36415 COLL VENOUS BLD VENIPUNCTURE: CPT | Mod: PN | Performed by: INTERNAL MEDICINE

## 2025-02-07 PROCEDURE — 25000003 PHARM REV CODE 250: Mod: PN | Performed by: INTERNAL MEDICINE

## 2025-02-07 PROCEDURE — 81025 URINE PREGNANCY TEST: CPT | Mod: PN | Performed by: INTERNAL MEDICINE

## 2025-02-07 PROCEDURE — 96415 CHEMO IV INFUSION ADDL HR: CPT | Mod: PN

## 2025-02-07 PROCEDURE — A4216 STERILE WATER/SALINE, 10 ML: HCPCS | Mod: PN | Performed by: INTERNAL MEDICINE

## 2025-02-07 PROCEDURE — 3008F BODY MASS INDEX DOCD: CPT | Mod: CPTII,S$GLB,, | Performed by: INTERNAL MEDICINE

## 2025-02-07 PROCEDURE — 85025 COMPLETE CBC W/AUTO DIFF WBC: CPT | Mod: PN | Performed by: INTERNAL MEDICINE

## 2025-02-07 PROCEDURE — 82607 VITAMIN B-12: CPT | Performed by: INTERNAL MEDICINE

## 2025-02-07 PROCEDURE — 99999 PR PBB SHADOW E&M-EST. PATIENT-LVL III: CPT | Mod: PBBFAC,,, | Performed by: INTERNAL MEDICINE

## 2025-02-07 PROCEDURE — 82746 ASSAY OF FOLIC ACID SERUM: CPT | Performed by: INTERNAL MEDICINE

## 2025-02-07 PROCEDURE — 83735 ASSAY OF MAGNESIUM: CPT | Mod: PN | Performed by: INTERNAL MEDICINE

## 2025-02-07 PROCEDURE — 99215 OFFICE O/P EST HI 40 MIN: CPT | Mod: S$GLB,,, | Performed by: INTERNAL MEDICINE

## 2025-02-07 PROCEDURE — 3078F DIAST BP <80 MM HG: CPT | Mod: CPTII,S$GLB,, | Performed by: INTERNAL MEDICINE

## 2025-02-07 RX ORDER — PROCHLORPERAZINE EDISYLATE 5 MG/ML
10 INJECTION INTRAMUSCULAR; INTRAVENOUS ONCE AS NEEDED
Status: CANCELLED
Start: 2025-02-07

## 2025-02-07 RX ORDER — EPINEPHRINE 0.3 MG/.3ML
0.3 INJECTION SUBCUTANEOUS ONCE AS NEEDED
Status: CANCELLED | OUTPATIENT
Start: 2025-02-07

## 2025-02-07 RX ORDER — PROCHLORPERAZINE EDISYLATE 5 MG/ML
10 INJECTION INTRAMUSCULAR; INTRAVENOUS ONCE AS NEEDED
Status: DISCONTINUED | OUTPATIENT
Start: 2025-02-07 | End: 2025-02-07 | Stop reason: HOSPADM

## 2025-02-07 RX ORDER — SODIUM CHLORIDE 0.9 % (FLUSH) 0.9 %
10 SYRINGE (ML) INJECTION
Status: CANCELLED | OUTPATIENT
Start: 2025-02-07

## 2025-02-07 RX ORDER — SODIUM CHLORIDE 0.9 % (FLUSH) 0.9 %
10 SYRINGE (ML) INJECTION
Status: DISCONTINUED | OUTPATIENT
Start: 2025-02-07 | End: 2025-02-07 | Stop reason: HOSPADM

## 2025-02-07 RX ORDER — HEPARIN 100 UNIT/ML
500 SYRINGE INTRAVENOUS
Status: DISCONTINUED | OUTPATIENT
Start: 2025-02-07 | End: 2025-02-07 | Stop reason: HOSPADM

## 2025-02-07 RX ORDER — DIPHENHYDRAMINE HYDROCHLORIDE 50 MG/ML
50 INJECTION INTRAMUSCULAR; INTRAVENOUS ONCE AS NEEDED
Status: DISCONTINUED | OUTPATIENT
Start: 2025-02-07 | End: 2025-02-07 | Stop reason: HOSPADM

## 2025-02-07 RX ORDER — EPINEPHRINE 0.3 MG/.3ML
0.3 INJECTION SUBCUTANEOUS ONCE AS NEEDED
Status: DISCONTINUED | OUTPATIENT
Start: 2025-02-07 | End: 2025-02-07 | Stop reason: HOSPADM

## 2025-02-07 RX ORDER — HEPARIN 100 UNIT/ML
500 SYRINGE INTRAVENOUS
Status: CANCELLED | OUTPATIENT
Start: 2025-02-07

## 2025-02-07 RX ORDER — DIPHENHYDRAMINE HYDROCHLORIDE 50 MG/ML
50 INJECTION INTRAMUSCULAR; INTRAVENOUS ONCE AS NEEDED
Status: CANCELLED | OUTPATIENT
Start: 2025-02-07

## 2025-02-07 RX ADMIN — DEXAMETHASONE SODIUM PHOSPHATE 0.25 MG: 10 INJECTION, SOLUTION INTRAMUSCULAR; INTRAVENOUS at 12:02

## 2025-02-07 RX ADMIN — DEXTROSE MONOHYDRATE: 5 INJECTION, SOLUTION INTRAVENOUS at 12:02

## 2025-02-07 RX ADMIN — OXALIPLATIN 234 MG: 5 INJECTION, SOLUTION INTRAVENOUS at 01:02

## 2025-02-07 RX ADMIN — Medication 10 ML: at 03:02

## 2025-02-07 NOTE — PLAN OF CARE
Tolerated oxaliplatin well.  No reactions noted.  No questions or concerns at this time.  Ambulated off unit in NAD.

## 2025-02-10 ENCOUNTER — DOCUMENTATION ONLY (OUTPATIENT)
Dept: INFUSION THERAPY | Facility: HOSPITAL | Age: 55
End: 2025-02-10
Payer: COMMERCIAL

## 2025-02-10 LAB — VIT B12 SERPL-MCNC: 357 NG/L (ref 180–914)

## 2025-02-10 NOTE — PROGRESS NOTES
Oncology   Chemotherapy Infusion Visit    Quick Social Service Status Follow Up   Met w/ pt briefly to follow up on social and emotional needs since initiation of treatment.    Pt is here for her first day and has a friend with her for support. Pt is worried about lab scheduled in March. Pt has sent a message to nurse for assistance. SW also saw she has labs scheduled to be drawn from her port. She understands and will follow up with nurse. SW provided support. No practical needs noted at this time.           Jasmin Peterson LCSW  02/10/2025  9:20 AM

## 2025-02-12 LAB — METHYLMALONATE SERPL-SCNC: 0.23 NMOL/ML

## 2025-02-13 ENCOUNTER — PATIENT MESSAGE (OUTPATIENT)
Dept: HEMATOLOGY/ONCOLOGY | Facility: CLINIC | Age: 55
End: 2025-02-13
Payer: COMMERCIAL

## 2025-02-26 ENCOUNTER — LAB VISIT (OUTPATIENT)
Dept: LAB | Facility: HOSPITAL | Age: 55
End: 2025-02-26
Attending: INTERNAL MEDICINE
Payer: COMMERCIAL

## 2025-02-26 ENCOUNTER — OFFICE VISIT (OUTPATIENT)
Dept: HEMATOLOGY/ONCOLOGY | Facility: CLINIC | Age: 55
End: 2025-02-26
Payer: COMMERCIAL

## 2025-02-26 VITALS
SYSTOLIC BLOOD PRESSURE: 100 MMHG | WEIGHT: 138.25 LBS | DIASTOLIC BLOOD PRESSURE: 61 MMHG | OXYGEN SATURATION: 98 % | TEMPERATURE: 98 F | HEART RATE: 111 BPM | HEIGHT: 70 IN | RESPIRATION RATE: 17 BRPM | BODY MASS INDEX: 19.79 KG/M2

## 2025-02-26 DIAGNOSIS — R19.7 DIARRHEA, UNSPECIFIED TYPE: ICD-10-CM

## 2025-02-26 DIAGNOSIS — E86.0 DEHYDRATION: ICD-10-CM

## 2025-02-26 DIAGNOSIS — C18.7 CANCER OF SIGMOID COLON: Primary | ICD-10-CM

## 2025-02-26 DIAGNOSIS — T45.1X5A CINV (CHEMOTHERAPY-INDUCED NAUSEA AND VOMITING): ICD-10-CM

## 2025-02-26 DIAGNOSIS — C18.7 CANCER OF SIGMOID COLON: ICD-10-CM

## 2025-02-26 DIAGNOSIS — D64.9 NORMOCYTIC ANEMIA: ICD-10-CM

## 2025-02-26 DIAGNOSIS — R11.2 CINV (CHEMOTHERAPY-INDUCED NAUSEA AND VOMITING): ICD-10-CM

## 2025-02-26 LAB
ALBUMIN SERPL BCP-MCNC: 2.9 G/DL (ref 3.5–5.2)
ALP SERPL-CCNC: 48 U/L (ref 40–150)
ALT SERPL W/O P-5'-P-CCNC: 32 U/L (ref 10–44)
ANION GAP SERPL CALC-SCNC: 10 MMOL/L (ref 8–16)
ANISOCYTOSIS BLD QL SMEAR: SLIGHT
AST SERPL-CCNC: 32 U/L (ref 10–40)
BASOPHILS # BLD AUTO: 0.02 K/UL (ref 0–0.2)
BASOPHILS NFR BLD: 0.4 % (ref 0–1.9)
BILIRUB SERPL-MCNC: 0.4 MG/DL (ref 0.1–1)
BUN SERPL-MCNC: 9 MG/DL (ref 6–20)
CALCIUM SERPL-MCNC: 8.4 MG/DL (ref 8.7–10.5)
CHLORIDE SERPL-SCNC: 102 MMOL/L (ref 95–110)
CO2 SERPL-SCNC: 25 MMOL/L (ref 23–29)
CREAT SERPL-MCNC: 0.7 MG/DL (ref 0.5–1.4)
DIFFERENTIAL METHOD BLD: ABNORMAL
EOSINOPHIL # BLD AUTO: 0.1 K/UL (ref 0–0.5)
EOSINOPHIL NFR BLD: 2.3 % (ref 0–8)
ERYTHROCYTE [DISTWIDTH] IN BLOOD BY AUTOMATED COUNT: 17.7 % (ref 11.5–14.5)
EST. GFR  (NO RACE VARIABLE): >60 ML/MIN/1.73 M^2
GLUCOSE SERPL-MCNC: 96 MG/DL (ref 70–110)
HCG INTACT+B SERPL-ACNC: 3.1 MIU/ML
HCT VFR BLD AUTO: 39.6 % (ref 37–48.5)
HGB BLD-MCNC: 13 G/DL (ref 12–16)
IMM GRANULOCYTES # BLD AUTO: 0.02 K/UL (ref 0–0.04)
IMM GRANULOCYTES NFR BLD AUTO: 0.4 % (ref 0–0.5)
LYMPHOCYTES # BLD AUTO: 0.9 K/UL (ref 1–4.8)
LYMPHOCYTES NFR BLD: 16 % (ref 18–48)
MAGNESIUM SERPL-MCNC: 1.9 MG/DL (ref 1.6–2.6)
MCH RBC QN AUTO: 29.2 PG (ref 27–31)
MCHC RBC AUTO-ENTMCNC: 32.8 G/DL (ref 32–36)
MCV RBC AUTO: 89 FL (ref 82–98)
MONOCYTES # BLD AUTO: 1.4 K/UL (ref 0.3–1)
MONOCYTES NFR BLD: 24.8 % (ref 4–15)
NEUTROPHILS # BLD AUTO: 3.2 K/UL (ref 1.8–7.7)
NEUTROPHILS NFR BLD: 56.1 % (ref 38–73)
NRBC BLD-RTO: 0 /100 WBC
PLATELET # BLD AUTO: 187 K/UL (ref 150–450)
PLATELET BLD QL SMEAR: ABNORMAL
PMV BLD AUTO: 9.7 FL (ref 9.2–12.9)
POTASSIUM SERPL-SCNC: 3.6 MMOL/L (ref 3.5–5.1)
PROT SERPL-MCNC: 5.5 G/DL (ref 6–8.4)
RBC # BLD AUTO: 4.45 M/UL (ref 4–5.4)
SODIUM SERPL-SCNC: 137 MMOL/L (ref 136–145)
WBC # BLD AUTO: 5.69 K/UL (ref 3.9–12.7)

## 2025-02-26 PROCEDURE — 3008F BODY MASS INDEX DOCD: CPT | Mod: CPTII,S$GLB,,

## 2025-02-26 PROCEDURE — 3078F DIAST BP <80 MM HG: CPT | Mod: CPTII,S$GLB,,

## 2025-02-26 PROCEDURE — 3074F SYST BP LT 130 MM HG: CPT | Mod: CPTII,S$GLB,,

## 2025-02-26 PROCEDURE — 1159F MED LIST DOCD IN RCRD: CPT | Mod: CPTII,S$GLB,,

## 2025-02-26 PROCEDURE — 83735 ASSAY OF MAGNESIUM: CPT | Mod: PN | Performed by: INTERNAL MEDICINE

## 2025-02-26 PROCEDURE — 84702 CHORIONIC GONADOTROPIN TEST: CPT | Performed by: INTERNAL MEDICINE

## 2025-02-26 PROCEDURE — 99999 PR PBB SHADOW E&M-EST. PATIENT-LVL IV: CPT | Mod: PBBFAC,,,

## 2025-02-26 PROCEDURE — 85025 COMPLETE CBC W/AUTO DIFF WBC: CPT | Mod: PN | Performed by: INTERNAL MEDICINE

## 2025-02-26 PROCEDURE — 1160F RVW MEDS BY RX/DR IN RCRD: CPT | Mod: CPTII,S$GLB,,

## 2025-02-26 PROCEDURE — 99213 OFFICE O/P EST LOW 20 MIN: CPT | Mod: S$GLB,,,

## 2025-02-26 PROCEDURE — 80053 COMPREHEN METABOLIC PANEL: CPT | Mod: PN | Performed by: INTERNAL MEDICINE

## 2025-02-26 PROCEDURE — G2211 COMPLEX E/M VISIT ADD ON: HCPCS | Mod: S$GLB,,,

## 2025-02-26 RX ORDER — HEPARIN 100 UNIT/ML
500 SYRINGE INTRAVENOUS
Status: CANCELLED | OUTPATIENT
Start: 2025-02-28

## 2025-02-26 RX ORDER — SODIUM CHLORIDE 0.9 % (FLUSH) 0.9 %
10 SYRINGE (ML) INJECTION
Status: CANCELLED | OUTPATIENT
Start: 2025-02-28

## 2025-02-26 NOTE — PROGRESS NOTES
PATIENT: Abbi Snider  MRN: [id number]  DATE: 2/26/2025      Diagnosis:   1. Cancer of sigmoid colon    2. Normocytic anemia    3. Dehydration    4. CINV (chemotherapy-induced nausea and vomiting)    5. Diarrhea, unspecified type        Chief Complaint: Follow-up (3 wk f/u w labs & tx)          Subjective:    Interval History: Ms. Snider is a 54 y.o. female who returns for follow up. She presented to the ED yesterday with complaints of abdominal pain and intractable nausea, was treated with fluids and anti-emetics and discharged home. No acute abnormality noted on CT scan on 2/25/25.  She also complains of diarrhea, averaging 3 episodes daily. Currently taking imodium. Denies fever, chills, sob, cp, palpitations, swelling, numbness, tingling,  constipation, new bumps, lumps, bleeding, bruising, dry or peeling skin, rash.  She reports fatigue, dry mouth, occasional abdominal discomfort. She states po intake has improved somewhat. Symptoms presented the day after last infusion.     Oncologic History:   Oncology History   Cancer of sigmoid colon   12/18/2024 Initial Diagnosis    Cancer of sigmoid colon     1/14/2025 Cancer Staged    Staging form: Colon and Rectum, AJCC 8th Edition  - Pathologic: Stage IIIB (pT3, pN1b, cM0)     2/3/2025 - 2/3/2025 Chemotherapy    Treatment Summary   Plan Name: OP COLORECTAL CAPECITABINE OXALIPLATIN  Treatment Goal: Curative  Status: Inactive  Start Date:   End Date:   Provider: Dragan Brian MD  Chemotherapy: capecitabine (XELODA) tablet 2,250 mg, 1,250 mg/m2 = 2,250 mg (original dose ), Oral, 2 times daily, 0 of 1 cycle, Start date: --, End date: --  Dose modification: 1,250 mg/m2 (Cycle 0)  oxaliplatin (ELOXATIN) 130 mg/m2 = 234 mg in D5W 546.8 mL chemo infusion, 130 mg/m2 = 234 mg, Intravenous, Clinic/HOD 1 time, 0 of 6 cycles     2/7/2025 -  Chemotherapy    Treatment Summary   Plan Name: OP COLORECTAL CAPECITABINE OXALIPLATIN Q3W  Treatment Goal: Curative  Status:  Active  Start Date: 2025  End Date: 2025 (Planned)  Provider: Dragan Brian MD  Chemotherapy: capecitabine (XELODA) tablet 2,700 mg, 1,500 mg/m2 = 2,700 mg (100 % of original dose 1,500 mg/m2), Oral, 2 times daily, 1 of 1 cycle, Start date: 2025, End date: --  Dose modification: 1,500 mg/m2 (original dose 1,500 mg/m2, Cycle 0)  oxaliplatin (ELOXATIN) 130 mg/m2 = 234 mg in D5W 611.8 mL chemo infusion, 130 mg/m2 = 234 mg, Intravenous, Clinic/HOD 1 time, 1 of 6 cycles  Administration: 234 mg (2025)         Past Medical History:   Past Medical History:   Diagnosis Date    Colon cancer     Murmur, cardiac     since childhood       Past Surgical HIstory:   Past Surgical History:   Procedure Laterality Date     SECTION, CLASSIC      COLONOSCOPY N/A 2024    Procedure: COLONOSCOPY;  Surgeon: Graeme Donahue MD;  Location: [facility] ENDO;  Service: Gastroenterology;  Laterality: N/A;    DV5 ROBOTIC COLECTOMY, SIGMOID Left 2024    Procedure: DV5 ROBOTIC COLECTOMY, SIGMOID W/ERAS;  Surgeon: ETIENNE Brandt MD;  Location: [facility] OR;  Service: Colon and Rectal;  Laterality: Left;    FLEXIBLE SIGMOIDOSCOPY N/A 2024    Procedure: SIGMOIDOSCOPY, FLEXIBLE;  Surgeon: ETIENNE Brandt MD;  Location: [facility] OR;  Service: Colon and Rectal;  Laterality: N/A;    INSERTION OF TUNNELED CENTRAL VENOUS CATHETER (CVC) WITH SUBCUTANEOUS PORT N/A 2025    Procedure: [id number];  Surgeon: Terry Petit MD;  Location: [facility] OR;  Service: General;  Laterality: N/A;    MOBILIZATION OF SPLENIC FLEXURE N/A 2024    Procedure: MOBILIZATION, SPLENIC FLEXURE;  Surgeon: ETIENNE Brandt MD;  Location: [facility] OR;  Service: Colon and Rectal;  Laterality: N/A;    WISDOM TOOTH EXTRACTION         Family History:   Family History   Problem Relation Name Age of Onset    Cancer Mother          liver due to transplant med    Heart disease Father      Breast cancer Maternal Grandmother       "Cancer Maternal Grandmother  41        breast cancer       Social History:  reports that she has never smoked. She has never been exposed to tobacco smoke. She has never used smokeless tobacco. She reports that she does not currently use alcohol. She reports that she does not use drugs.    Allergies:  Review of patient's allergies indicates:   Allergen Reactions    Contrast media Rash       Medications:  Current Medications[1]    Review of Systems   Constitutional:  Positive for fatigue. Negative for fever.   HENT: Negative.     Respiratory:  Negative for cough and shortness of breath.    Cardiovascular:  Negative for chest pain, palpitations and leg swelling.   Gastrointestinal:  Positive for abdominal pain (Intermittent) and diarrhea. Negative for nausea and vomiting.   Genitourinary: Negative.    Musculoskeletal: Negative.    Skin: Negative.    Neurological: Negative.    Hematological: Negative.    Psychiatric/Behavioral: Negative.         ECOG Performance Status:   ECOG SCORE             Objective:      Vitals:   Vitals:    02/26/25 1447   BP: 100/61   BP Location: Left arm   Patient Position: Sitting   Pulse: (!) 111   Resp: 17   Temp: 98.3 °F (36.8 °C)   TempSrc: Temporal   SpO2: 98%   Weight: 62.7 kg (138 lb 3.7 oz)   Height: 5' 9.5" (1.765 m)     BMI: Body mass index is 20.12 kg/m².    Physical Exam  HENT:      Head: Normocephalic.      Mouth/Throat:      Mouth: Mucous membranes are moist.      Pharynx: Oropharynx is clear.   Cardiovascular:      Rate and Rhythm: Normal rate and regular rhythm.      Heart sounds: Normal heart sounds.   Pulmonary:      Effort: Pulmonary effort is normal.      Breath sounds: Normal breath sounds.   Abdominal:      General: Bowel sounds are normal.   Musculoskeletal:         General: Normal range of motion.      Cervical back: Normal range of motion.   Skin:     General: Skin is warm and dry.   Neurological:      Mental Status: She is alert and oriented to person, place, and " time.   Psychiatric:         Mood and Affect: Mood normal.         Behavior: Behavior normal.       Laboratory Data:  Recent Results (from the past 24 hours)   Magnesium    Collection Time: 02/26/25  2:31 PM   Result Value Ref Range    Magnesium 1.9 1.6 - 2.6 mg/dL   CBC w/ DIFF    Collection Time: 02/26/25  2:31 PM   Result Value Ref Range    WBC 5.69 3.90 - 12.70 K/uL    RBC 4.45 4.00 - 5.40 M/uL    Hemoglobin 13.0 12.0 - 16.0 g/dL    Hematocrit 39.6 37.0 - 48.5 %    MCV 89 82 - 98 fL    MCH 29.2 27.0 - 31.0 pg    MCHC 32.8 32.0 - 36.0 g/dL    RDW 17.7 (H) 11.5 - 14.5 %    Platelets 187 150 - 450 K/uL    MPV 9.7 9.2 - 12.9 fL    Immature Granulocytes 0.4 0.0 - 0.5 %    Gran # (ANC) 3.2 1.8 - 7.7 K/uL    Immature Grans (Abs) 0.02 0.00 - 0.04 K/uL    Lymph # 0.9 (L) 1.0 - 4.8 K/uL    Mono # 1.4 (H) 0.3 - 1.0 K/uL    Eos # 0.1 0.0 - 0.5 K/uL    Baso # 0.02 0.00 - 0.20 K/uL    nRBC 0 0 /100 WBC    Gran % 56.1 38.0 - 73.0 %    Lymph % 16.0 (L) 18.0 - 48.0 %    Mono % 24.8 (H) 4.0 - 15.0 %    Eosinophil % 2.3 0.0 - 8.0 %    Basophil % 0.4 0.0 - 1.9 %    Platelet Estimate Appears normal     Aniso Slight     Differential Method Automated    CMP    Collection Time: 02/26/25  2:31 PM   Result Value Ref Range    Sodium 137 136 - 145 mmol/L    Potassium 3.6 3.5 - 5.1 mmol/L    Chloride 102 95 - 110 mmol/L    CO2 25 23 - 29 mmol/L    Glucose 96 70 - 110 mg/dL    BUN 9 6 - 20 mg/dL    Creatinine 0.7 0.5 - 1.4 mg/dL    Calcium 8.4 (L) 8.7 - 10.5 mg/dL    Total Protein 5.5 (L) 6.0 - 8.4 g/dL    Albumin 2.9 (L) 3.5 - 5.2 g/dL    Total Bilirubin 0.4 0.1 - 1.0 mg/dL    Alkaline Phosphatase 48 40 - 150 U/L    AST 32 10 - 40 U/L    ALT 32 10 - 44 U/L    eGFR >60.0 >60 mL/min/1.73 m^2    Anion Gap 10 8 - 16 mmol/L          Imaging: CT abdomen pelvis with contrast, 12/11/2024.     FINDINGS:  Lung Bases: Well aerated, without consolidation or pleural fluid.     Heart: Normal in size. No pericardial effusion.     Liver: Normal in size and  attenuation, with no focal hepatic lesions.     Gallbladder: No calcified gallstones.     Bile Ducts: No evidence of dilated ducts.     Pancreas: No mass or peripancreatic fat stranding.     Spleen: Unremarkable.     Adrenals: Unremarkable.     Kidneys/ Ureters: No hydronephrosis or radiodense stones.  Ureters are normal in course and caliber.     Bladder: No evidence of wall thickening.     Reproductive organs: Unremarkable.     GI Tract/Mesentery: Stomach is normal in appearance.  Small bowel is not dilated.  The appendix is normal (series 3, image [id number]).  Interval sigmoid colectomy previously seen enlarged lymph node in the sigmoid mesentery is not identified.  No definite acute colonic abnormality.     Peritoneal Space: No ascites. No free air.     Retroperitoneum: No significant adenopathy.     Abdominal wall: Tiny fat and omentum containing umbilical hernia.     Vasculature: No significant atherosclerosis. No aneurysm.     Bones: No acute fracture or aggressive-appearing lytic or blastic lesion.  Mild reverse S-shaped thoracolumbar curvature.     Impression:     1. No acute intra-abdominal abnormality.  2. Additional findings as above.        Electronically signed by:Brady Hurley MD  Date:                                            02/25/2025   Assessment:       1. Cancer of sigmoid colon    2. Normocytic anemia    3. Dehydration    4. CINV (chemotherapy-induced nausea and vomiting)    5. 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 diagnosed on hemicolectomy 12/23/2024   -The patient with intact nuclear expression of MLH1, PMS2, MSH2, and MSH6  -The patient with nodule seen in the liver on CT abdomen 12/11/2024   -MRI abdomen 1/18/25 showed cysts in the liver with no concern for mets  -Pharmacogenomic panel showed normal DPYD metabolism  -Pt stated she would prefer CAPOX for 3 months  -Capecitabine dosing  850mg/mm2 twice daily for 14/21 day cycle  -Will proceed with cycle 3 on 2/28/25 pending UPT results    Visit today included increased complexity associated with the care of the episodic problem (chemotherapy) addressed and managing the longitudinal care of the patient due to the serious and/or complex managed problem(s) colon cancer.     Normocytic Anemia   - Hemoglobin 11g/dL on 2/07/25  -Will check B12, folate and iron studies today  -Will monitor    CINV  -improved with antiemetics  -Alternate PRN medications as needed.   -CT Abdomen 2/25/25 - No acute process    Dehydration  -due to decreased PO intake, vomiting, diarrhea   -Encouraged to increase PO intake of hydrating liquids.  -Will add additional fluids with treatment   -Electrolytes WNL on today's labs.      Med Onc Chart Routing      Follow up with physician . As scheduled   Follow up with [facility]    Infusion scheduling note   Ok for treatment. Add 1L ns   Injection scheduling note    Labs    Imaging    Pharmacy appointment    Other referrals              Plan was discussed with the patient at length, and she verbalized understanding. Abbi was given an opportunity to ask questions that were answered to her satisfaction, and she was advised to call in the interval if any problems or questions arise.    Assessment/Plan reviewed and approved by Dr Brian    21 minutes were spent in coordination of patient's care, record review and counseling.    HELGA Crane, FNP-C  Hematology & Oncology           [1]  Current Outpatient Medications   Medication Sig Dispense Refill    ibuprofen (ADVIL,MOTRIN) 600 MG tablet Take 1 tablet (600 mg total) by mouth 3 (three) times daily. 15 tablet 0    multivit-mins no.63/iron/folic (M-VIT ORAL) Take 1 tablet by mouth once daily.      ondansetron (ZOFRAN-ODT) 4 MG TbDL Take 1 tablet (4 mg total) by mouth every 6 (six) hours as needed (nausea). 60 tablet 3    oxyCODONE (ROXICODONE) 5 MG immediate release tablet Take 1 tablet  (5 mg total) by mouth every 4 (four) hours as needed for Pain. 30 tablet 0    prochlorperazine (COMPAZINE) 10 MG tablet Take 1 tablet (10 mg total) by mouth every 6 (six) hours as needed. 15 tablet 0    promethazine (PHENERGAN) 25 MG tablet Take 1 tablet (25 mg total) by mouth every 4 (four) hours. 60 tablet 3    capecitabine (XELODA) 500 MG Tab Take 3 tablets (1,500 mg total) by mouth 2 (two) times daily on days 1-14 of a 21 day cycle. (Patient not taking: Reported on 1/29/2025.) 84 tablet 5    LIDOcaine-prilocaine (EMLA) cream Apply topically as needed (apply 30 to 60 minutes prior to chemo). (Patient not taking: Reported on 2/26/2025) 30 g 2     No current facility-administered medications for this visit.

## 2025-02-27 RX ORDER — PROCHLORPERAZINE EDISYLATE 5 MG/ML
10 INJECTION INTRAMUSCULAR; INTRAVENOUS ONCE AS NEEDED
Status: CANCELLED
Start: 2025-02-28

## 2025-02-27 RX ORDER — EPINEPHRINE 0.3 MG/.3ML
0.3 INJECTION SUBCUTANEOUS ONCE AS NEEDED
Status: CANCELLED | OUTPATIENT
Start: 2025-02-28

## 2025-02-27 RX ORDER — HEPARIN 100 UNIT/ML
500 SYRINGE INTRAVENOUS
Status: CANCELLED | OUTPATIENT
Start: 2025-02-28

## 2025-02-27 RX ORDER — SODIUM CHLORIDE 0.9 % (FLUSH) 0.9 %
10 SYRINGE (ML) INJECTION
Status: CANCELLED | OUTPATIENT
Start: 2025-02-28

## 2025-02-27 RX ORDER — DIPHENHYDRAMINE HYDROCHLORIDE 50 MG/ML
50 INJECTION INTRAMUSCULAR; INTRAVENOUS ONCE AS NEEDED
Status: CANCELLED | OUTPATIENT
Start: 2025-02-28

## 2025-02-28 ENCOUNTER — INFUSION (OUTPATIENT)
Dept: INFUSION THERAPY | Facility: HOSPITAL | Age: 55
End: 2025-02-28
Attending: INTERNAL MEDICINE
Payer: COMMERCIAL

## 2025-02-28 ENCOUNTER — DOCUMENTATION ONLY (OUTPATIENT)
Dept: INFUSION THERAPY | Facility: HOSPITAL | Age: 55
End: 2025-02-28
Payer: COMMERCIAL

## 2025-02-28 VITALS
SYSTOLIC BLOOD PRESSURE: 126 MMHG | OXYGEN SATURATION: 99 % | HEIGHT: 70 IN | TEMPERATURE: 98 F | DIASTOLIC BLOOD PRESSURE: 79 MMHG | RESPIRATION RATE: 18 BRPM | HEART RATE: 62 BPM | BODY MASS INDEX: 19.53 KG/M2 | WEIGHT: 136.44 LBS

## 2025-02-28 DIAGNOSIS — C18.7 CANCER OF SIGMOID COLON: ICD-10-CM

## 2025-02-28 DIAGNOSIS — E86.0 DEHYDRATION: Primary | ICD-10-CM

## 2025-02-28 PROCEDURE — 63600175 PHARM REV CODE 636 W HCPCS: Mod: PN

## 2025-02-28 PROCEDURE — 96413 CHEMO IV INFUSION 1 HR: CPT | Mod: PN

## 2025-02-28 PROCEDURE — 96361 HYDRATE IV INFUSION ADD-ON: CPT | Mod: PN

## 2025-02-28 PROCEDURE — A4216 STERILE WATER/SALINE, 10 ML: HCPCS | Mod: PN

## 2025-02-28 PROCEDURE — 96367 TX/PROPH/DG ADDL SEQ IV INF: CPT | Mod: PN

## 2025-02-28 PROCEDURE — 96415 CHEMO IV INFUSION ADDL HR: CPT | Mod: PN

## 2025-02-28 PROCEDURE — 25000003 PHARM REV CODE 250: Mod: PN

## 2025-02-28 PROCEDURE — 96375 TX/PRO/DX INJ NEW DRUG ADDON: CPT | Mod: PN

## 2025-02-28 RX ORDER — HEPARIN 100 UNIT/ML
500 SYRINGE INTRAVENOUS
OUTPATIENT
Start: 2025-02-28

## 2025-02-28 RX ORDER — HEPARIN 100 UNIT/ML
500 SYRINGE INTRAVENOUS
Status: DISCONTINUED | OUTPATIENT
Start: 2025-02-28 | End: 2025-02-28 | Stop reason: HOSPADM

## 2025-02-28 RX ORDER — DIPHENHYDRAMINE HYDROCHLORIDE 50 MG/ML
50 INJECTION INTRAMUSCULAR; INTRAVENOUS ONCE AS NEEDED
Status: DISCONTINUED | OUTPATIENT
Start: 2025-02-28 | End: 2025-02-28 | Stop reason: HOSPADM

## 2025-02-28 RX ORDER — SODIUM CHLORIDE 0.9 % (FLUSH) 0.9 %
10 SYRINGE (ML) INJECTION
OUTPATIENT
Start: 2025-02-28

## 2025-02-28 RX ORDER — SODIUM CHLORIDE 0.9 % (FLUSH) 0.9 %
10 SYRINGE (ML) INJECTION
Status: DISCONTINUED | OUTPATIENT
Start: 2025-02-28 | End: 2025-02-28 | Stop reason: HOSPADM

## 2025-02-28 RX ORDER — PROCHLORPERAZINE EDISYLATE 5 MG/ML
10 INJECTION INTRAMUSCULAR; INTRAVENOUS ONCE AS NEEDED
Status: DISCONTINUED | OUTPATIENT
Start: 2025-02-28 | End: 2025-02-28 | Stop reason: HOSPADM

## 2025-02-28 RX ORDER — EPINEPHRINE 0.3 MG/.3ML
0.3 INJECTION SUBCUTANEOUS ONCE AS NEEDED
Status: DISCONTINUED | OUTPATIENT
Start: 2025-02-28 | End: 2025-02-28 | Stop reason: HOSPADM

## 2025-02-28 RX ADMIN — DEXTROSE MONOHYDRATE: 5 INJECTION, SOLUTION INTRAVENOUS at 10:02

## 2025-02-28 RX ADMIN — Medication 10 ML: at 01:02

## 2025-02-28 RX ADMIN — SODIUM CHLORIDE 1000 ML: 9 INJECTION, SOLUTION INTRAVENOUS at 09:02

## 2025-02-28 RX ADMIN — OXALIPLATIN 234 MG: 5 INJECTION, SOLUTION INTRAVENOUS at 11:02

## 2025-02-28 RX ADMIN — DEXAMETHASONE SODIUM PHOSPHATE 0.25 MG: 10 INJECTION, SOLUTION INTRAMUSCULAR; INTRAVENOUS at 10:02

## 2025-02-28 RX ADMIN — HYDROCORTISONE SODIUM SUCCINATE 100 MG: 100 INJECTION, POWDER, FOR SOLUTION INTRAMUSCULAR; INTRAVENOUS at 01:02

## 2025-02-28 NOTE — PROGRESS NOTES
ONCOLOGY NUTRITION   FOLLOW UP VISIT        Abbi Snider is a 54 y.o. female.  DATE: 02/28/2025        Oncology Diagnosis: Cancer of sigmoid colon     REFERRAL FROM:  [] Integrative Oncology   [] Med/Heme Oncology  [] Radiation Oncology  [] Surgical Oncology   [] Infusion Nurse    [x] Routine Nutrition follow up    TREATMENT PLAN:   [] Full treatment plan pending  [x] Chemotherapy  [] Immunotherapy  [] Radiation  [] Concurrent  [] Surgery  [] Treatment complete/post-treatment    ANTHROPOMETRICS:  Wt Readings from Last 10 Encounters:   02/28/25 61.9 kg (136 lb 7.4 oz)   02/26/25 62.7 kg (138 lb 3.7 oz)   02/25/25 66 kg (145 lb 8.1 oz)   02/07/25 66 kg (145 lb 8.1 oz)   02/07/25 66 kg (145 lb 8.1 oz)   02/01/25 64.4 kg (141 lb 15.6 oz)   01/29/25 65.5 kg (144 lb 6.4 oz)   01/27/25 65.7 kg (144 lb 13.5 oz)   01/24/25 65.7 kg (144 lb 13.5 oz)   01/15/25 65.2 kg (143 lb 11.8 oz)      Weight Changes: has decreased 9 pounds over last month  This is 6.2% in less than a month.     PHYSICAL EXAM:  Muscle Wasting Observed:  [] No Deficit   [x] Mild Deficit   [] Moderate   [] Severe    INTAKE:  [x] PO Intake [] TF Intake  Current Diet: regular diet  Dietary Patterns:  Eating meals/snacks as tolerated  [] Oral nutritional supplements: none reported    SYMPTOMS/COMPLAINTS:  [] No nutritional concerns at current  [x] Diarrhea                    [] Constipation           [x] Nausea                 [x] Vomiting                [] Indigestion                [] Reflux              [] Poor Appetite            [] Anorexia                 [] Early Satiety         [] Gas                       [] Bloating                     [] Dry Mouth    [] Mucositis                   [] Mouth Sores           [] Poor Dentition      [] Difficulty chewing  [] Difficulty Swallowing   [] Pain with swallowing [] Change in taste      [] Change in smell   [] Pain (general)       [] Fatigue                      [] Sleep issues    [x] Weight loss  []  other, please specify    Nutrition Re-Assessment Risk: Severe    [x] Labs reviewed   [x] Meds reviewed    Education Provided:   [] No Education Needed at this time  [x] Diarrhea                                              [] Constipation                          [x] Nausea/Vomiting  [] Mucositis                [] Dry Mouth    [] Dealing with changes in Taste/Smell  [] Dealing with Poor Appetite   [] Soft/moist Diet      [x] Weight Loss/Gain     [] Weight Maintenance                           [] Indigestion/GERD                 [] Gas/Bloating          [] Foods High/ Low in specific nutrients [] Increasing Calories/Protein   [] Milkshake/Smoothies Recipes   [] Nutrition Supplements                        [] Increasing Fluid Intakes         [] Foods that fight cancer    [] Evidence bases resources                 [] Fermented Foods/Probiotics  [] Mediterranean/Plant Based Diet     [] Other, specify                                   [x] Handouts provided: Nausea and Vomiting, Diarrhea, Enterade      [x] Samples provided: Enterade     RD NOTE:  RD met with patient at chairside during infusion tx of C2 Capox. Pt had a rough first tx, ending up in ER with intractable N/V, diarrhea and dehydration. Pt states this cycle they are trying to prepare by giving her extra fluids today in conjunction with her tx. Pt states the side effects did not start till a week or so after receiving the chemo. She has been eating what she can but obviously it has been very hard.     RD Goals:  [] Weight stable                  [] Weight gain                      [] Weight Loss                               [] Continue adequate Kcal/protein   [x] Increase Kcal/protein      [] Adjust Tube-feeding Rx   [] Tolerate Tube Feedings             [] Increase tube feedings to goal     [] Tolerate Supplements     [x] Symptom Improvement   [x] Understand nutrition Education  [] Offer supportive visits   [] other, please specify    RECOMMENDATIONS:  KATHARINE  diet  Smaller, more frequent bland meals  1 cup water after each loose bowel movement  Medication as prescribed  1-2 bottles of Enterade per day    Follow up: Next infusion or PRN throughout tx    Franchesca Khan MS, RD, LDN  02/28/2025  10:36 AM

## 2025-02-28 NOTE — PLAN OF CARE
"  Problem: Adult Inpatient Plan of Care  Goal: Plan of Care Review  Outcome: Progressing  Flowsheets (Taken 2/28/2025 1003)  Plan of Care Reviewed With: patient  Goal: Patient-Specific Goal (Individualized)  Outcome: Progressing  Flowsheets (Taken 2/28/2025 1003)  Individualized Care Needs: recliner, dimmed lights, blanket  Anxieties, Fears or Concerns: started new antiemetic  Patient/Family-Specific Goals (Include Timeframe): no change  in status     Problem: Fatigue  Goal: Improved Activity Tolerance  Outcome: Progressing  Intervention: Promote Improved Energy  Flowsheets (Taken 2/28/2025 1003)  Fatigue Management: paced activity encouraged  Sleep/Rest Enhancement:   natural light exposure provided   noise level reduced   room darkened  Activity Management:   Ambulated -L4   Ambulated in riley - L4   Up in chair - L3  Environmental Support: calm environment promoted     Pt completed and tolerated infusion, VSS. Shortly after leaving unit pt was found sitting in the waiting room, nurse asked if pt was feeling ok and pt stated she took a breath of cold air and feel the bronchospasms which making it difficult to talk. Nurse helped pt back into infusion unit and wrapped pt in blanket to warm up. VSS, msg sent to charge nurse and MD. Pt reports "some improvement", able to talk but still feel the tingling in the throat and fingers. Nurse accessed port and gave PRN angelica VSS. Snehal ALVARADO came to chairside, per MD no additional meds at this time and just monitor pt. Offered warm liquids to pt with improvement with symptoms. VSS. Pt ready for d/c, pt aware when to report to the ED. Pt left unit ambulatory.   "

## 2025-02-28 NOTE — PROGRESS NOTES
SW met with pt at chairside. Pt reports this past week has been difficulty. She has been nauseated and not eating much. She took off this week from work. She did tell her doctor and her medication for nausea was changed. She has also met with RD today for assistance as well.   SW offered to link pt to [employer] for delivered meals. She declined for now stating her [employer] as brought her meals. She still has them because not able to eat them. SW encouraged her to let SW if interested in this in the future. She agreed. CARMINA provided support and acknowledged her challenges.     Jasmin Peterson, ANTW

## 2025-03-05 DIAGNOSIS — Z51.11 ENCOUNTER FOR ANTINEOPLASTIC CHEMOTHERAPY: Primary | ICD-10-CM

## 2025-03-05 RX ORDER — PROCHLORPERAZINE MALEATE 10 MG
10 TABLET ORAL EVERY 6 HOURS PRN
Qty: 15 TABLET | Refills: 0 | Status: SHIPPED | OUTPATIENT
Start: 2025-03-05

## 2025-03-05 RX ORDER — DIPHENOXYLATE HYDROCHLORIDE AND ATROPINE SULFATE 2.5; .025 MG/1; MG/1
1 TABLET ORAL 4 TIMES DAILY PRN
Qty: 40 TABLET | Refills: 0 | Status: SHIPPED | OUTPATIENT
Start: 2025-03-05 | End: 2025-03-15

## 2025-03-05 RX ORDER — PROCHLORPERAZINE MALEATE 10 MG
10 TABLET ORAL EVERY 6 HOURS PRN
Qty: 30 TABLET | Refills: 1 | Status: SHIPPED | OUTPATIENT
Start: 2025-03-05 | End: 2026-03-05

## 2025-03-12 ENCOUNTER — OFFICE VISIT (OUTPATIENT)
Dept: HEMATOLOGY/ONCOLOGY | Facility: CLINIC | Age: 55
End: 2025-03-12
Payer: COMMERCIAL

## 2025-03-12 ENCOUNTER — LAB VISIT (OUTPATIENT)
Dept: LAB | Facility: HOSPITAL | Age: 55
End: 2025-03-12
Attending: INTERNAL MEDICINE
Payer: COMMERCIAL

## 2025-03-12 VITALS
SYSTOLIC BLOOD PRESSURE: 135 MMHG | BODY MASS INDEX: 18.45 KG/M2 | TEMPERATURE: 98 F | RESPIRATION RATE: 17 BRPM | OXYGEN SATURATION: 96 % | HEIGHT: 69 IN | WEIGHT: 124.56 LBS | HEART RATE: 75 BPM | DIASTOLIC BLOOD PRESSURE: 70 MMHG

## 2025-03-12 DIAGNOSIS — R11.2 CINV (CHEMOTHERAPY-INDUCED NAUSEA AND VOMITING): Primary | ICD-10-CM

## 2025-03-12 DIAGNOSIS — C18.7 CANCER OF SIGMOID COLON: ICD-10-CM

## 2025-03-12 DIAGNOSIS — R53.83 FATIGUE, UNSPECIFIED TYPE: ICD-10-CM

## 2025-03-12 DIAGNOSIS — E86.0 DEHYDRATION: ICD-10-CM

## 2025-03-12 DIAGNOSIS — T45.1X5A CINV (CHEMOTHERAPY-INDUCED NAUSEA AND VOMITING): Primary | ICD-10-CM

## 2025-03-12 LAB
ALBUMIN SERPL BCP-MCNC: 3.4 G/DL (ref 3.5–5.2)
ALP SERPL-CCNC: 78 U/L (ref 40–150)
ALT SERPL W/O P-5'-P-CCNC: 68 U/L (ref 10–44)
ANION GAP SERPL CALC-SCNC: 13 MMOL/L (ref 8–16)
AST SERPL-CCNC: 48 U/L (ref 10–40)
BASOPHILS # BLD AUTO: 0.01 K/UL (ref 0–0.2)
BASOPHILS NFR BLD: 0.4 % (ref 0–1.9)
BILIRUB SERPL-MCNC: 0.6 MG/DL (ref 0.1–1)
BUN SERPL-MCNC: 13 MG/DL (ref 6–20)
CALCIUM SERPL-MCNC: 9.6 MG/DL (ref 8.7–10.5)
CHLORIDE SERPL-SCNC: 98 MMOL/L (ref 95–110)
CO2 SERPL-SCNC: 21 MMOL/L (ref 23–29)
CREAT SERPL-MCNC: 0.8 MG/DL (ref 0.5–1.4)
DIFFERENTIAL METHOD BLD: ABNORMAL
EOSINOPHIL # BLD AUTO: 0 K/UL (ref 0–0.5)
EOSINOPHIL NFR BLD: 1.8 % (ref 0–8)
ERYTHROCYTE [DISTWIDTH] IN BLOOD BY AUTOMATED COUNT: 18.1 % (ref 11.5–14.5)
EST. GFR  (NO RACE VARIABLE): >60 ML/MIN/1.73 M^2
GLUCOSE SERPL-MCNC: 127 MG/DL (ref 70–110)
HCT VFR BLD AUTO: 39.5 % (ref 37–48.5)
HGB BLD-MCNC: 13.2 G/DL (ref 12–16)
IMM GRANULOCYTES # BLD AUTO: 0.01 K/UL (ref 0–0.04)
IMM GRANULOCYTES NFR BLD AUTO: 0.4 % (ref 0–0.5)
LYMPHOCYTES # BLD AUTO: 0.8 K/UL (ref 1–4.8)
LYMPHOCYTES NFR BLD: 36.4 % (ref 18–48)
MCH RBC QN AUTO: 29.1 PG (ref 27–31)
MCHC RBC AUTO-ENTMCNC: 33.4 G/DL (ref 32–36)
MCV RBC AUTO: 87 FL (ref 82–98)
MONOCYTES # BLD AUTO: 0.5 K/UL (ref 0.3–1)
MONOCYTES NFR BLD: 23.2 % (ref 4–15)
NEUTROPHILS # BLD AUTO: 0.9 K/UL (ref 1.8–7.7)
NEUTROPHILS NFR BLD: 37.8 % (ref 38–73)
NRBC BLD-RTO: 0 /100 WBC
PLATELET # BLD AUTO: 206 K/UL (ref 150–450)
PLATELET BLD QL SMEAR: ABNORMAL
PMV BLD AUTO: 10 FL (ref 9.2–12.9)
POTASSIUM SERPL-SCNC: 4 MMOL/L (ref 3.5–5.1)
PROT SERPL-MCNC: 6.8 G/DL (ref 6–8.4)
RBC # BLD AUTO: 4.53 M/UL (ref 4–5.4)
SODIUM SERPL-SCNC: 132 MMOL/L (ref 136–145)
WBC # BLD AUTO: 2.28 K/UL (ref 3.9–12.7)

## 2025-03-12 PROCEDURE — 80053 COMPREHEN METABOLIC PANEL: CPT | Mod: PN | Performed by: INTERNAL MEDICINE

## 2025-03-12 PROCEDURE — 99999 PR PBB SHADOW E&M-EST. PATIENT-LVL IV: CPT | Mod: PBBFAC,,,

## 2025-03-12 PROCEDURE — 36415 COLL VENOUS BLD VENIPUNCTURE: CPT | Mod: PN | Performed by: INTERNAL MEDICINE

## 2025-03-12 PROCEDURE — 85025 COMPLETE CBC W/AUTO DIFF WBC: CPT | Mod: PN | Performed by: INTERNAL MEDICINE

## 2025-03-12 NOTE — PROGRESS NOTES
PATIENT: Abbi Snider  MRN: [id number]  DATE: 3/12/2025      Diagnosis:   1. CINV (chemotherapy-induced nausea and vomiting)    2. Fatigue, unspecified type    3. Dehydration          Chief Complaint: Follow-up (Cancer of sigmoid colon/)          Subjective:    Interval History: Ms. Snider is a 54 y.o. female who returns for follow up. She reports nausea and fatigue after treatment on 2/28/25. She also reports some skin peeling to palms, which she is treating with urea cream. She has stopped Xeloda. Denies fever, chills, sob, cp, palpitations, swelling, numbness, tingling, constipation, new bumps, lumps, bleeding, bruising. She was recently prescribed promethazine, which she plans on filling this afternoon.     Oncologic History:   Oncology History   Cancer of sigmoid colon   12/18/2024 Initial Diagnosis    Cancer of sigmoid colon     1/14/2025 Cancer Staged    Staging form: Colon and Rectum, AJCC 8th Edition  - Pathologic: Stage IIIB (pT3, pN1b, cM0)     2/3/2025 - 2/3/2025 Chemotherapy    Treatment Summary   Plan Name: OP COLORECTAL CAPECITABINE OXALIPLATIN  Treatment Goal: Curative  Status: Inactive  Start Date:   End Date:   Provider: Dragan Brian MD  Chemotherapy: capecitabine (XELODA) tablet 2,250 mg, 1,250 mg/m2 = 2,250 mg (original dose ), Oral, 2 times daily, 0 of 1 cycle, Start date: --, End date: --  Dose modification: 1,250 mg/m2 (Cycle 0)  oxaliplatin (ELOXATIN) 130 mg/m2 = 234 mg in D5W 546.8 mL chemo infusion, 130 mg/m2 = 234 mg, Intravenous, Clinic/HOD 1 time, 0 of 6 cycles     2/7/2025 -  Chemotherapy    Treatment Summary   Plan Name: OP COLORECTAL CAPECITABINE OXALIPLATIN Q3W  Treatment Goal: Curative  Status: Active  Start Date: 2/7/2025  End Date: 5/23/2025 (Planned)  Provider: Dragan Brian MD  Chemotherapy: capecitabine (XELODA) tablet 2,700 mg, 1,500 mg/m2 = 2,700 mg (100 % of original dose 1,500 mg/m2), Oral, 2 times daily, 1 of 1 cycle, Start date: 2/2/2025, End date: --  Dose  modification: 1,500 mg/m2 (original dose 1,500 mg/m2, Cycle 0)  oxaliplatin (ELOXATIN) 130 mg/m2 = 234 mg in D5W 611.8 mL chemo infusion, 130 mg/m2 = 234 mg, Intravenous, Clinic/HOD 1 time, 2 of 6 cycles  Administration: 234 mg (2025), 234 mg (2025)         Past Medical History:   Past Medical History:   Diagnosis Date    Colon cancer     Murmur, cardiac     since childhood       Past Surgical HIstory:   Past Surgical History:   Procedure Laterality Date     SECTION, CLASSIC      COLONOSCOPY N/A 2024    Procedure: COLONOSCOPY;  Surgeon: Graeme Donahue MD;  Location: [facility] ENDO;  Service: Gastroenterology;  Laterality: N/A;    DV5 ROBOTIC COLECTOMY, SIGMOID Left 2024    Procedure: DV5 ROBOTIC COLECTOMY, SIGMOID W/ERAS;  Surgeon: ETIENNE Brandt MD;  Location: [facility] OR;  Service: Colon and Rectal;  Laterality: Left;    FLEXIBLE SIGMOIDOSCOPY N/A 2024    Procedure: SIGMOIDOSCOPY, FLEXIBLE;  Surgeon: ETIENNE Brandt MD;  Location: [facility] OR;  Service: Colon and Rectal;  Laterality: N/A;    INSERTION OF TUNNELED CENTRAL VENOUS CATHETER (CVC) WITH SUBCUTANEOUS PORT N/A 2025    Procedure: [id number];  Surgeon: Terry Petit MD;  Location: [facility] OR;  Service: General;  Laterality: N/A;    MOBILIZATION OF SPLENIC FLEXURE N/A 2024    Procedure: MOBILIZATION, SPLENIC FLEXURE;  Surgeon: ETIENNE Brandt MD;  Location: [facility] OR;  Service: Colon and Rectal;  Laterality: N/A;    WISDOM TOOTH EXTRACTION         Family History:   Family History   Problem Relation Name Age of Onset    Cancer Mother          liver due to transplant med    Heart disease Father      Breast cancer Maternal Grandmother      Cancer Maternal Grandmother  41        breast cancer       Social History:  reports that she has never smoked. She has never been exposed to tobacco smoke. She has never used smokeless tobacco. She reports that she does not currently use alcohol. She reports that she does  "not use drugs.    Allergies:  Review of patient's allergies indicates:   Allergen Reactions    Contrast media Rash       Medications:  Current Medications[1]    Review of Systems   Constitutional:  Positive for fatigue. Negative for fever.   HENT: Negative.     Respiratory:  Negative for cough and shortness of breath.    Cardiovascular:  Negative for chest pain, palpitations and leg swelling.   Gastrointestinal:  Positive for abdominal pain (Intermittent) and diarrhea. Negative for nausea and vomiting.   Genitourinary: Negative.    Musculoskeletal: Negative.    Skin: Negative.    Neurological: Negative.    Hematological: Negative.    Psychiatric/Behavioral: Negative.         ECOG Performance Status:   ECOG SCORE             Objective:      Vitals:   Vitals:    03/12/25 1355   BP: 135/70   BP Location: Left arm   Patient Position: Sitting   Pulse: 75   Resp: 17   Temp: 97.9 °F (36.6 °C)   TempSrc: Temporal   SpO2: 96%   Weight: 56.5 kg (124 lb 9 oz)   Height: 5' 9" (1.753 m)       BMI: Body mass index is 18.39 kg/m².    Physical Exam  HENT:      Head: Normocephalic.      Mouth/Throat:      Mouth: Mucous membranes are moist.      Pharynx: Oropharynx is clear.   Cardiovascular:      Rate and Rhythm: Normal rate and regular rhythm.      Heart sounds: Normal heart sounds.   Pulmonary:      Effort: Pulmonary effort is normal.      Breath sounds: Normal breath sounds.   Abdominal:      General: Bowel sounds are normal.   Musculoskeletal:         General: Normal range of motion.      Cervical back: Normal range of motion.   Skin:     General: Skin is warm and dry.   Neurological:      Mental Status: She is alert and oriented to person, place, and time.   Psychiatric:         Mood and Affect: Mood normal.         Behavior: Behavior normal.         Laboratory Data:  Recent Results (from the past 24 hours)   CBC Auto Differential    Collection Time: 03/12/25  1:20 PM   Result Value Ref Range    WBC 2.28 (L) 3.90 - 12.70 K/uL "    RBC 4.53 4.00 - 5.40 M/uL    Hemoglobin 13.2 12.0 - 16.0 g/dL    Hematocrit 39.5 37.0 - 48.5 %    MCV 87 82 - 98 fL    MCH 29.1 27.0 - 31.0 pg    MCHC 33.4 32.0 - 36.0 g/dL    RDW 18.1 (H) 11.5 - 14.5 %    Platelets 206 150 - 450 K/uL    MPV 10.0 9.2 - 12.9 fL    Immature Granulocytes 0.4 0.0 - 0.5 %    Gran # (ANC) 0.9 (L) 1.8 - 7.7 K/uL    Immature Grans (Abs) 0.01 0.00 - 0.04 K/uL    Lymph # 0.8 (L) 1.0 - 4.8 K/uL    Mono # 0.5 0.3 - 1.0 K/uL    Eos # 0.0 0.0 - 0.5 K/uL    Baso # 0.01 0.00 - 0.20 K/uL    nRBC 0 0 /100 WBC    Gran % 37.8 (L) 38.0 - 73.0 %    Lymph % 36.4 18.0 - 48.0 %    Mono % 23.2 (H) 4.0 - 15.0 %    Eosinophil % 1.8 0.0 - 8.0 %    Basophil % 0.4 0.0 - 1.9 %    Platelet Estimate Appears normal     Differential Method Automated    Comprehensive Metabolic Panel    Collection Time: 03/12/25  1:20 PM   Result Value Ref Range    Sodium 132 (L) 136 - 145 mmol/L    Potassium 4.0 3.5 - 5.1 mmol/L    Chloride 98 95 - 110 mmol/L    CO2 21 (L) 23 - 29 mmol/L    Glucose 127 (H) 70 - 110 mg/dL    BUN 13 6 - 20 mg/dL    Creatinine 0.8 0.5 - 1.4 mg/dL    Calcium 9.6 8.7 - 10.5 mg/dL    Total Protein 6.8 6.0 - 8.4 g/dL    Albumin 3.4 (L) 3.5 - 5.2 g/dL    Total Bilirubin 0.6 0.1 - 1.0 mg/dL    Alkaline Phosphatase 78 40 - 150 U/L    AST 48 (H) 10 - 40 U/L    ALT 68 (H) 10 - 44 U/L    eGFR >60.0 >60 mL/min/1.73 m^2    Anion Gap 13 8 - 16 mmol/L            Imaging: CT abdomen pelvis with contrast, 12/11/2024.     FINDINGS:  Lung Bases: Well aerated, without consolidation or pleural fluid.     Heart: Normal in size. No pericardial effusion.     Liver: Normal in size and attenuation, with no focal hepatic lesions.     Gallbladder: No calcified gallstones.     Bile Ducts: No evidence of dilated ducts.     Pancreas: No mass or peripancreatic fat stranding.     Spleen: Unremarkable.     Adrenals: Unremarkable.     Kidneys/ Ureters: No hydronephrosis or radiodense stones.  Ureters are normal in course and caliber.      Bladder: No evidence of wall thickening.     Reproductive organs: Unremarkable.     GI Tract/Mesentery: Stomach is normal in appearance.  Small bowel is not dilated.  The appendix is normal (series 3, image [id number]).  Interval sigmoid colectomy previously seen enlarged lymph node in the sigmoid mesentery is not identified.  No definite acute colonic abnormality.     Peritoneal Space: No ascites. No free air.     Retroperitoneum: No significant adenopathy.     Abdominal wall: Tiny fat and omentum containing umbilical hernia.     Vasculature: No significant atherosclerosis. No aneurysm.     Bones: No acute fracture or aggressive-appearing lytic or blastic lesion.  Mild reverse S-shaped thoracolumbar curvature.     Impression:     1. No acute intra-abdominal abnormality.  2. Additional findings as above.        Electronically signed by:Brady Hurley MD  Date:                                            02/25/2025   Assessment:       1. CINV (chemotherapy-induced nausea and vomiting)    2. Fatigue, unspecified type    3. Dehydration             Plan:       CINV  -Continue with antiemetics  -Alternate PRN medications as needed.   -CT Abdomen 2/25/25 - No acute process    Dehydration  -due to decreased PO intake, vomiting, diarrhea   -Encouraged to increase PO intake of hydrating liquids.  -Electrolytes WNL on today's labs.  -Declines IV hydration and antiemetics today.       Med Onc Chart Routing      Follow up with physician . As scheduled with Dr Brian   Follow up with [facility]    Infusion scheduling note    Injection scheduling note    Labs    Imaging    Pharmacy appointment    Other referrals                  Plan was discussed with the patient at length, and she verbalized understanding. Abbi was given an opportunity to ask questions that were answered to her satisfaction, and she was advised to call in the interval if any problems or questions arise.    Assessment/Plan reviewed and approved by Dr Brian    16   minutes were spent in coordination of patient's care, record review and counseling.    HELGA Crane, FNP-C  Hematology & Oncology         [1]   Current Outpatient Medications   Medication Sig Dispense Refill    capecitabine (XELODA) 500 MG Tab Take 3 tablets (1,500 mg total) by mouth 2 (two) times daily on days 1-14 of a 21 day cycle. 84 tablet 5    diphenoxylate-atropine 2.5-0.025 mg (LOMOTIL) 2.5-0.025 mg per tablet Take 1 tablet by mouth 4 (four) times daily as needed for Diarrhea. 40 tablet 0    ibuprofen (ADVIL,MOTRIN) 600 MG tablet Take 1 tablet (600 mg total) by mouth 3 (three) times daily. 15 tablet 0    LIDOcaine-prilocaine (EMLA) cream Apply topically as needed (apply 30 to 60 minutes prior to chemo). 30 g 2    multivit-mins no.63/iron/folic (M-VIT ORAL) Take 1 tablet by mouth once daily.      ondansetron (ZOFRAN-ODT) 4 MG TbDL Take 1 tablet (4 mg total) by mouth every 6 (six) hours as needed (nausea). 60 tablet 3    oxyCODONE (ROXICODONE) 5 MG immediate release tablet Take 1 tablet (5 mg total) by mouth every 4 (four) hours as needed for Pain. 30 tablet 0    prochlorperazine (COMPAZINE) 10 MG tablet Take 1 tablet (10 mg total) by mouth every 6 (six) hours as needed. 30 tablet 1    prochlorperazine (COMPAZINE) 10 MG tablet Take 1 tablet (10 mg total) by mouth every 6 (six) hours as needed. 15 tablet 0    promethazine (PHENERGAN) 25 MG tablet Take 1 tablet (25 mg total) by mouth every 4 (four) hours. 60 tablet 3     No current facility-administered medications for this visit.

## 2025-03-13 ENCOUNTER — TELEPHONE (OUTPATIENT)
Dept: HEMATOLOGY/ONCOLOGY | Facility: CLINIC | Age: 55
End: 2025-03-13
Payer: COMMERCIAL

## 2025-03-13 NOTE — TELEPHONE ENCOUNTER
I called and spoke to the patient about her SOB concern. She had an appt with Brad Albarran NP yesterday. She said she is able to keep down some food and water. New SOB started last night. Denies palpitation, chest pain or lightheadedness and otherwise feels okay. SOB better when she rests and worse when she sits up and walks. I told her to monitor, I will check back with her later today and to go to ER if it gets any worse. Dr. Brian aware.

## 2025-03-15 PROBLEM — R11.2 INTRACTABLE NAUSEA AND VOMITING: Status: ACTIVE | Noted: 2025-03-15

## 2025-03-15 PROBLEM — E46 MALNOURISHED: Status: ACTIVE | Noted: 2025-03-15

## 2025-03-15 PROBLEM — E87.6 HYPOKALEMIA: Status: ACTIVE | Noted: 2025-03-15

## 2025-03-15 PROBLEM — E87.1 HYPONATREMIA: Status: ACTIVE | Noted: 2025-03-15

## 2025-03-15 PROBLEM — R19.7 DIARRHEA: Status: ACTIVE | Noted: 2025-03-15

## 2025-03-16 PROBLEM — E43 SEVERE MALNUTRITION: Status: ACTIVE | Noted: 2025-03-16

## 2025-03-20 PROBLEM — E83.42 HYPOMAGNESEMIA: Status: ACTIVE | Noted: 2025-03-20

## 2025-03-28 ENCOUNTER — LAB VISIT (OUTPATIENT)
Dept: LAB | Facility: HOSPITAL | Age: 55
End: 2025-03-28
Attending: INTERNAL MEDICINE
Payer: COMMERCIAL

## 2025-03-28 ENCOUNTER — OFFICE VISIT (OUTPATIENT)
Dept: HEMATOLOGY/ONCOLOGY | Facility: CLINIC | Age: 55
End: 2025-03-28
Payer: COMMERCIAL

## 2025-03-28 VITALS
HEART RATE: 88 BPM | RESPIRATION RATE: 16 BRPM | BODY MASS INDEX: 19.16 KG/M2 | HEIGHT: 70 IN | DIASTOLIC BLOOD PRESSURE: 66 MMHG | SYSTOLIC BLOOD PRESSURE: 112 MMHG | OXYGEN SATURATION: 100 % | TEMPERATURE: 97 F | WEIGHT: 133.81 LBS

## 2025-03-28 DIAGNOSIS — A04.72 C. DIFFICILE DIARRHEA: ICD-10-CM

## 2025-03-28 DIAGNOSIS — D64.9 NORMOCYTIC ANEMIA: ICD-10-CM

## 2025-03-28 DIAGNOSIS — Z79.69 IMMUNODEFICIENCY DUE TO CHEMOTHERAPY: ICD-10-CM

## 2025-03-28 DIAGNOSIS — T45.1X5A IMMUNODEFICIENCY DUE TO CHEMOTHERAPY: ICD-10-CM

## 2025-03-28 DIAGNOSIS — Z79.899 IMMUNODEFICIENCY DUE TO CHEMOTHERAPY: ICD-10-CM

## 2025-03-28 DIAGNOSIS — D84.821 IMMUNODEFICIENCY DUE TO CHEMOTHERAPY: ICD-10-CM

## 2025-03-28 DIAGNOSIS — C18.7 CANCER OF SIGMOID COLON: Primary | ICD-10-CM

## 2025-03-28 DIAGNOSIS — C18.7 CANCER OF SIGMOID COLON: ICD-10-CM

## 2025-03-28 LAB
ABSOLUTE EOSINOPHIL (OHS): 0.33 K/UL
ABSOLUTE MONOCYTE (OHS): 0.61 K/UL (ref 0.3–1)
ABSOLUTE NEUTROPHIL COUNT (OHS): 3.36 K/UL (ref 1.8–7.7)
ALBUMIN SERPL BCP-MCNC: 3 G/DL (ref 3.5–5.2)
ALP SERPL-CCNC: 89 UNIT/L (ref 40–150)
ALT SERPL W/O P-5'-P-CCNC: 40 UNIT/L (ref 10–44)
ANION GAP (OHS): 8 MMOL/L (ref 8–16)
ANISOCYTOSIS BLD QL SMEAR: SLIGHT
AST SERPL-CCNC: 35 UNIT/L (ref 11–45)
BASOPHILS # BLD AUTO: 0.04 K/UL
BASOPHILS NFR BLD AUTO: 0.7 %
BILIRUB SERPL-MCNC: 0.2 MG/DL (ref 0.1–1)
BUN SERPL-MCNC: 9 MG/DL (ref 6–20)
CALCIUM SERPL-MCNC: 8.8 MG/DL (ref 8.7–10.5)
CHLORIDE SERPL-SCNC: 104 MMOL/L (ref 95–110)
CO2 SERPL-SCNC: 29 MMOL/L (ref 23–29)
CREAT SERPL-MCNC: 0.6 MG/DL (ref 0.5–1.4)
ERYTHROCYTE [DISTWIDTH] IN BLOOD BY AUTOMATED COUNT: 23.1 % (ref 11.5–14.5)
GFR SERPLBLD CREATININE-BSD FMLA CKD-EPI: >60 ML/MIN/1.73/M2
GLUCOSE SERPL-MCNC: 91 MG/DL (ref 70–110)
HCT VFR BLD AUTO: 28.3 % (ref 37–48.5)
HGB BLD-MCNC: 9.3 GM/DL (ref 12–16)
IMM GRANULOCYTES # BLD AUTO: 0.02 K/UL (ref 0–0.04)
IMM GRANULOCYTES NFR BLD AUTO: 0.3 % (ref 0–0.5)
LYMPHOCYTES # BLD AUTO: 1.46 K/UL (ref 1–4.8)
MCH RBC QN AUTO: 30.4 PG (ref 27–50)
MCHC RBC AUTO-ENTMCNC: 32.9 G/DL (ref 32–36)
MCV RBC AUTO: 93 FL (ref 82–98)
NUCLEATED RBC (/100WBC) (OHS): 0 /100 WBC
PLATELET # BLD AUTO: 217 K/UL (ref 150–450)
PLATELET BLD QL SMEAR: NORMAL
PMV BLD AUTO: 9.2 FL (ref 9.2–12.9)
POTASSIUM SERPL-SCNC: 3.5 MMOL/L (ref 3.5–5.1)
PROT SERPL-MCNC: 5.5 GM/DL (ref 6–8.4)
RBC # BLD AUTO: 3.06 M/UL (ref 4–5.4)
RELATIVE EOSINOPHIL (OHS): 5.7 %
RELATIVE LYMPHOCYTE (OHS): 25.1 % (ref 18–48)
RELATIVE MONOCYTE (OHS): 10.5 % (ref 4–15)
RELATIVE NEUTROPHIL (OHS): 57.7 % (ref 38–73)
SODIUM SERPL-SCNC: 141 MMOL/L (ref 136–145)
WBC # BLD AUTO: 5.82 K/UL (ref 3.9–12.7)

## 2025-03-28 PROCEDURE — 1159F MED LIST DOCD IN RCRD: CPT | Mod: CPTII,S$GLB,, | Performed by: INTERNAL MEDICINE

## 2025-03-28 PROCEDURE — 82040 ASSAY OF SERUM ALBUMIN: CPT | Mod: PN

## 2025-03-28 PROCEDURE — 85025 COMPLETE CBC W/AUTO DIFF WBC: CPT | Mod: PN

## 2025-03-28 PROCEDURE — 99999 PR PBB SHADOW E&M-EST. PATIENT-LVL IV: CPT | Mod: PBBFAC,,, | Performed by: INTERNAL MEDICINE

## 2025-03-28 PROCEDURE — 3008F BODY MASS INDEX DOCD: CPT | Mod: CPTII,S$GLB,, | Performed by: INTERNAL MEDICINE

## 2025-03-28 PROCEDURE — 36415 COLL VENOUS BLD VENIPUNCTURE: CPT | Mod: PN

## 2025-03-28 PROCEDURE — 3074F SYST BP LT 130 MM HG: CPT | Mod: CPTII,S$GLB,, | Performed by: INTERNAL MEDICINE

## 2025-03-28 PROCEDURE — 3078F DIAST BP <80 MM HG: CPT | Mod: CPTII,S$GLB,, | Performed by: INTERNAL MEDICINE

## 2025-03-28 PROCEDURE — G2211 COMPLEX E/M VISIT ADD ON: HCPCS | Mod: S$GLB,,, | Performed by: INTERNAL MEDICINE

## 2025-03-28 PROCEDURE — 1111F DSCHRG MED/CURRENT MED MERGE: CPT | Mod: CPTII,S$GLB,, | Performed by: INTERNAL MEDICINE

## 2025-03-28 PROCEDURE — 99215 OFFICE O/P EST HI 40 MIN: CPT | Mod: S$GLB,,, | Performed by: INTERNAL MEDICINE

## 2025-03-28 RX ORDER — OLANZAPINE 5 MG/1
5 TABLET ORAL NIGHTLY
Qty: 6 TABLET | Refills: 11 | Status: SHIPPED | OUTPATIENT
Start: 2025-03-30

## 2025-03-28 NOTE — PLAN OF CARE
DISCONTINUE ON PATHWAY REGIMEN - Colorectal    COS82        Capecitabine (Xeloda)       Oxaliplatin     **Always confirm dose/schedule in your pharmacy ordering system**    REASON: Toxicities / Adverse Event  PRIOR TREATMENT: COS82  TREATMENT RESPONSE: Unable to Evaluate    START ON PATHWAY REGIMEN - Colorectal    COS83        Oxaliplatin       Leucovorin       Fluorouracil       Fluorouracil     **Always confirm dose/schedule in your pharmacy ordering system**    Patient Characteristics:  Postoperative without Neoadjuvant Therapy, M0 (Pathologic Staging), Colon, Stage   III, Low Risk (pT1-3, pN1)  Tumor Location: Colon  Therapeutic Status: Postoperative without Neoadjuvant Therapy, M0 (Pathologic   Staging)  AJCC M Category: cM0  AJCC T Category: pT3  AJCC N Category: pN1  AJCC 8 Stage Grouping: IIIB  Intent of Therapy:  Curative Intent, Discussed with Patient

## 2025-03-28 NOTE — PROGRESS NOTES
PATIENT: Abbi Snider  MRN: [id number]  DATE: 3/29/2025      Diagnosis:   1. Cancer of sigmoid colon    2. Immunodeficiency due to chemotherapy    3. Normocytic anemia    4. C. difficile diarrhea              Chief Complaint: Follow-up (Cancer of sigmoid colon)      Oncologic History:      Oncologic History     Oncologic Treatment     Pathology           Subjective:    Interval History: Ms. Snider is a 54 y.o. female presents with colon cancer.  Since the last clinic visit the patient was admitted to the hospital from 3/15/25-3/21/25 for c diff colitis.  Pt finishing Dificid this week.  PT wishes to switch to to Fluorouracil.  Pt states she is eating better now that the Cdiff is treated.  The patient denies CP, cough, SOB, abdominal pain, nausea, vomiting, constipation, diarrhea.  The patient denies fever, chills, night sweats, new lumps or bumps, easy bruising or bleeding.    Prior History:  The patient initially underwent colonoscopy on 12/09/2024 for workup of bright red blood per rectum.  Colonoscopy showed a partially obstructing tumor in the distal sigmoid colon.  Pathology from the procedure showed moderately differentiated adenocarcinoma with intact nuclear expression of MLH1, PMS2, MSH2, and MSH6.  CT of the abdomen and pelvis on 12/11/2024 showed multiple hepatic hypodensities many which too small to characterize with the largest measuring 10 mm in the lateral segment of the left hepatic lobe; circumferential colonic wall thickening approximately 5.6 cm in length within the sigmoid colon; and a hypodense mesenteric lymph node measuring 17 x 19 mm.  CT chest on 12/18/2024 showed no evidence of metastatic disease in the chest.  The patient underwent a robotic hemicolectomy under the care of Dr Brandt on 12/23/24 with path showing adenocarcinoma, moderately differentiated, measuring 5.8 x 3.5 x 1.6 cm with invasion through the bowel wall just into the fat, negative margins, 2/24 lymph nodes positive pT3  pN1b.   Pharmacogenomic testing showed normal DPYD metabolism.  MRI [facility] on 25 showed SHARONA.    Past Medical History:   Past Medical History:   Diagnosis Date    Colon cancer     Murmur, cardiac     since childhood       Past Surgical HIstory:   Past Surgical History:   Procedure Laterality Date     SECTION, CLASSIC      COLONOSCOPY N/A 2024    Procedure: COLONOSCOPY;  Surgeon: Graeme Donahue MD;  Location: [facility] ENDO;  Service: Gastroenterology;  Laterality: N/A;    DV5 ROBOTIC COLECTOMY, SIGMOID Left 2024    Procedure: DV5 ROBOTIC COLECTOMY, SIGMOID W/ERAS;  Surgeon: ETIENNE Brandt MD;  [facility];  Service: Colon and Rectal;  Laterality: Left;    FLEXIBLE SIGMOIDOSCOPY N/A 2024    Procedure: SIGMOIDOSCOPY, FLEXIBLE;  Surgeon: ETIENNE Brandt MD;  Location: [facility] OR;  Service: Colon and Rectal;  Laterality: N/A;    INSERTION OF TUNNELED CENTRAL VENOUS CATHETER (CVC) WITH SUBCUTANEOUS PORT N/A 2025    Procedure: [id number];  Surgeon: Terry Petit MD;  Location: [facility] OR;  Service: General;  Laterality: N/A;    MOBILIZATION OF SPLENIC FLEXURE N/A 2024    Procedure: MOBILIZATION, SPLENIC FLEXURE;  Surgeon: ETIENNE Brandt MD;  Location: [facility] OR;  Service: Colon and Rectal;  Laterality: N/A;    WISDOM TOOTH EXTRACTION         Family History:   Family History   Problem Relation Name Age of Onset    Cancer Mother          liver due to transplant med    Heart disease Father      Breast cancer Maternal Grandmother      Cancer Maternal Grandmother  41        breast cancer       Social History:  reports that she has never smoked. She has never been exposed to tobacco smoke. She has never used smokeless tobacco. She reports that she does not currently use alcohol. She reports that she does not use drugs.    Allergies:  Review of patient's allergies indicates:   Allergen Reactions    Contrast media Rash       Medications:  Current Outpatient Medications    Medication Sig Dispense Refill    capecitabine (XELODA) 500 MG Tab Take 3 tablets (1,500 mg total) by mouth 2 (two) times daily on days 1-14 of a 21 day cycle. 84 tablet 5    diphenoxylate-atropine 2.5-0.025 mg (LOMOTIL) 2.5-0.025 mg per tablet Take 1 tablet by mouth 4 (four) times daily as needed for Diarrhea. 20 tablet 0    ibuprofen (ADVIL,MOTRIN) 600 MG tablet Take 1 tablet (600 mg total) by mouth 3 (three) times daily. 15 tablet 0    Lactobacillus rhamnosus GG (CULTURELLE) 10 billion cell capsule Take 1 capsule by mouth once daily. 20 capsule 0    multivit-mins no.63/iron/folic (M-VIT ORAL) Take 1 tablet by mouth once daily.      ondansetron (ZOFRAN-ODT) 4 MG TbDL Take 1 tablet (4 mg total) by mouth every 6 (six) hours as needed (nausea). 60 tablet 3    oxyCODONE (ROXICODONE) 5 MG immediate release tablet Take 1 tablet (5 mg total) by mouth every 4 (four) hours as needed for Pain. 30 tablet 0    potassium chloride SA (K-DUR,KLOR-CON) 20 MEQ tablet Take 1 tablet (20 mEq total) by mouth once daily. 3 tablet 0    prochlorperazine (COMPAZINE) 10 MG tablet Take 1 tablet (10 mg total) by mouth every 6 (six) hours as needed. 30 tablet 1    prochlorperazine (COMPAZINE) 10 MG tablet Take 1 tablet (10 mg total) by mouth every 6 (six) hours as needed. 15 tablet 0    promethazine (PHENERGAN) 25 MG tablet Take 1 tablet (25 mg total) by mouth every 4 (four) hours. 60 tablet 3    LIDOcaine-prilocaine (EMLA) cream Apply topically as needed (apply 30 to 60 minutes prior to chemo). (Patient not taking: Reported on 3/28/2025) 30 g 2    magnesium oxide (MAG-OX) 400 mg (241.3 mg magnesium) tablet Take 1 tablet (400 mg total) by mouth once daily. (Patient not taking: Reported on 3/28/2025) 7 tablet 0    [START ON 3/30/2025] OLANZapine (ZYPREXA) 5 MG tablet Take 1 tablet (5 mg total) by mouth every evening. Take nightly on days 1-3 of each chemotherapy cycle. 6 tablet 11     No current facility-administered medications for this  "visit.       Review of Systems   Constitutional:  Positive for unexpected weight change. Negative for chills, fatigue and fever.   Respiratory:  Negative for cough and shortness of breath.    Cardiovascular:  Negative for chest pain and palpitations.   Gastrointestinal:  Negative for abdominal pain, constipation, diarrhea, nausea and vomiting.   Skin:  Negative for rash.   Neurological:  Negative for headaches.   Hematological:  Negative for adenopathy. Does not bruise/bleed easily.       ECOG Performance Status: 0   Objective:      Vitals:   Vitals:    03/28/25 1150   BP: 112/66   BP Location: Left arm   Patient Position: Sitting   Pulse: 88   Resp: 16   Temp: 97.2 °F (36.2 °C)   TempSrc: Temporal   SpO2: 100%   Weight: 60.7 kg (133 lb 13.1 oz)   Height: 5' 10" (1.778 m)             Physical Exam  Constitutional:       General: She is not in acute distress.     Appearance: She is well-developed. She is not diaphoretic.   HENT:      Head: Normocephalic and atraumatic.   Cardiovascular:      Rate and Rhythm: Normal rate and regular rhythm.      Heart sounds: Normal heart sounds. No murmur heard.     No friction rub. No gallop.   Pulmonary:      Effort: Pulmonary effort is normal. No respiratory distress.      Breath sounds: Normal breath sounds. No wheezing or rales.   Chest:      Chest wall: No tenderness.   Abdominal:      General: Bowel sounds are normal. There is no distension.      Palpations: Abdomen is soft. There is no mass.      Tenderness: There is no abdominal tenderness. There is no guarding or rebound.   Lymphadenopathy:      Cervical: No cervical adenopathy.      Upper Body:      Right upper body: No supraclavicular or axillary adenopathy.      Left upper body: No supraclavicular or axillary adenopathy.   Skin:     Findings: No erythema or rash.   Neurological:      Mental Status: She is alert and oriented to person, place, and time.   Psychiatric:         Behavior: Behavior normal.         Laboratory " Data:  Lab Visit on 03/28/2025   Component Date Value Ref Range Status    Sodium 03/28/2025 141  136 - 145 mmol/L Final    Potassium 03/28/2025 3.5  3.5 - 5.1 mmol/L Final    Chloride 03/28/2025 104  95 - 110 mmol/L Final    CO2 03/28/2025 29  23 - 29 mmol/L Final    Glucose 03/28/2025 91  70 - 110 mg/dL Final    BUN 03/28/2025 9  6 - 20 mg/dL Final    Creatinine 03/28/2025 0.6  0.5 - 1.4 mg/dL Final    Calcium 03/28/2025 8.8  8.7 - 10.5 mg/dL Final    Protein Total 03/28/2025 5.5 (L)  6.0 - 8.4 gm/dL Final    Albumin 03/28/2025 3.0 (L)  3.5 - 5.2 g/dL Final    Bilirubin Total 03/28/2025 0.2  0.1 - 1.0 mg/dL Final    For infants and newborns, interpretation of results should be based   on gestational age, weight and in agreement with clinical   observations.    Premature Infant recommended reference ranges:   0-24 hours:  <8.0 mg/dL   24-48 hours: <12.0 mg/dL   3-5 days:    <15.0 mg/dL   6-29 days:   <15.0 mg/dL    ALP 03/28/2025 89  40 - 150 unit/L Final    AST 03/28/2025 35  11 - 45 unit/L Final    ALT 03/28/2025 40  10 - 44 unit/L Final    Anion Gap 03/28/2025 8  8 - 16 mmol/L Final    eGFR 03/28/2025 >60  >60 mL/min/1.73/m2 Final    Estimated GFR calculated using the CKD-EPI creatinine (2021) equation.    WBC 03/28/2025 5.82  3.90 - 12.70 K/uL Final    RBC 03/28/2025 3.06 (L)  4.00 - 5.40 M/uL Final    HGB 03/28/2025 9.3 (L)  12.0 - 16.0 gm/dL Final    HCT 03/28/2025 28.3 (L)  37.0 - 48.5 % Final    MCV 03/28/2025 93  82 - 98 fL Final    MCH 03/28/2025 30.4  27.0 - 50.0 pg Final    MCHC 03/28/2025 32.9  32.0 - 36.0 g/dL Final    RDW 03/28/2025 23.1 (H)  11.5 - 14.5 % Final    Platelet Count 03/28/2025 217  150 - 450 K/uL Final    MPV 03/28/2025 9.2  9.2 - 12.9 fL Final    Nucleated RBC 03/28/2025 0  <=0 /100 WBC Final    Neut % 03/28/2025 57.7  38 - 73 % Final    Lymph % 03/28/2025 25.1  18 - 48 % Final    Mono % 03/28/2025 10.5  4 - 15 % Final    Eos % 03/28/2025 5.7  <=8 % Final    Basophil % 03/28/2025 0.7   <=1.9 % Final    Imm Grans % 03/28/2025 0.3  0.0 - 0.5 % Final    Neut # 03/28/2025 3.36  1.8 - 7.7 K/uL Final    Lymph # 03/28/2025 1.46  1 - 4.8 K/uL Final    Mono # 03/28/2025 0.61  0.3 - 1 K/uL Final    Eos # 03/28/2025 0.33  <=0.5 K/uL Final    Baso # 03/28/2025 0.04  <=0.2 K/uL Final    Imm Grans # 03/28/2025 0.02  0.00 - 0.04 K/uL Final    Mild elevation in immature granulocytes is non specific and can be seen in a variety of conditions including stress response, acute inflammation, trauma and pregnancy. Correlation with other laboratory and clinical findings is essential.    Platelet Estimate 03/28/2025 Appears Normal    Final    Anisocytosis 03/28/2025 Slight   Final         Imaging:     MRI Abdomen 1/18/25    The liver is normal in size and contour.  A few tiny cysts are scattered about the hepatic parenchyma.  There are no suspicious hepatic lesions.     The pancreas, spleen, adrenal glands, and kidneys are unremarkable.  The patient's known sigmoid mass is partially visualized on some sequences.       Assessment:       1. Cancer of sigmoid colon    2. Immunodeficiency due to chemotherapy    3. Normocytic anemia    4. C. difficile diarrhea           Plan:     Colon Cancer - the patient has stage IIIB adenocarcinoma of the sigmoid colon pT3 N1b diagnosed on hemicolectomy 12/23/2024   -The patient with intact nuclear expression of MLH1, PMS2, MSH2, and MSH6  -The patient with nodule seen in the liver on CT abdomen 12/11/2024   -MRI abdomen 1/18/25 showed cysts in the liver with no concern for mets  -Pharmacogenomic panel showed normal DPYD metabolism  -PT was to be on CAPOX for 3 months  -Capecitabine dosing 850mg/mm2 twice daily for 14/21 day cycle  -Pt completed 2 cycles but has had significant symptoms on treatment  -Pt wishes to switch to FOLFOX  -Will plan on 3 FOLFOX treatments starting next week  Patient was consented for chemotherapy today 3/29/2025 for oxaliplatin and fluorouracil.   An extensive  discussion was had which included a thorough discussion of the risk and benefits of treatment and alternatives.  Risks, including but not limited to, possible hair loss, bone marrow damage (anemia, thrombocytopenia, immune suppression, neutropenia), damage to body organs (brain, heart, liver, kidney, lungs, nervous system, skin, and others), allergic reactions, sterility, nausea/vomiting, constipation/diarrhea, sores in the mouth, secondary cancers, local damage at possible injection sites, and rarely death were all discussed.  The patient agrees with the plan, and all questions have been answered to their satisfaction.  Consent was signed the patient, provider, and a third party witness.    -Consented the patient to the treatment plan and the patient was educated on the planned duration of the treatment and schedule of the treatment administration.  Visit today included increased complexity associated with the care of the episodic problem (chemotherapy) addressed and managing the longitudinal care of the patient due to the serious and/or complex managed problem(s) colon cancer.    Immunodeficiency due to chemo - pt at increased risk of infection  -No current signs of infection  -Will monitor    C diff - pt on dificid and is to finish treatment this week  -Stools formed  -Will monitor    Normocytic Anemia - Hemoglobin 9.3g/dL on 3/28/25  -Ferritin 22ng/mL on 2/07/25 with normal TIBC  -B12 and folate normal  -Will monitor    Route Chart for Scheduling    Med Onc Chart Routing      Follow up with physician 4 weeks. Pt needs approval for chemo and to be scheduled on Monday.  Pt needs a CBC, CMP, and Mg in 2 weeks with appt with NP the friday before treatmetn.  The patient needs the same labs in 4 weeks with an appt with me the friday before treatment.   Follow up with [facility] 2 weeks.   Infusion scheduling note    Injection scheduling note    Labs    Imaging    Pharmacy appointment    Other referrals              Treatment  Plan Information   OP GI [id number] (oxaliplatin leucovorin fluorouracil) Q2W Dragan Brian MD   Associated diagnosis: Cancer of sigmoid colon Stage IIIB pT3, pN1b, cM0 noted on 12/18/2024   Line of treatment: Adjuvant  Treatment Goal: Curative     Upcoming Treatment Dates - OP GI [id number] (oxaliplatin leucovorin fluorouracil) Q2W    3/31/2025       Chemotherapy       oxaliplatin (ELOXATIN) 85 mg/m2 = 147 mg in D5W 529.4 mL chemo infusion       leucovorin calcium 400 mg/m2 = 690 mg in D5W 250 mL infusion       fluorouraciL injection 690 mg       fluorouracil (Adrucil) 2,400 mg/m2 = 4,150 mg in 0.9% NaCl 100 mL chemo infusion       Antiemetics       palonosetron 0.25mg/dexAMETHasone 12mg in NS IVPB 0.25 mg 50 mL  4/14/2025       Chemotherapy       oxaliplatin (ELOXATIN) 85 mg/m2 = 147 mg in D5W 529.4 mL chemo infusion       leucovorin calcium 400 mg/m2 = 690 mg in D5W 250 mL infusion       fluorouraciL injection 690 mg       fluorouracil (Adrucil) 2,400 mg/m2 = 4,150 mg in 0.9% NaCl 100 mL chemo infusion       Antiemetics       palonosetron 0.25mg/dexAMETHasone 12mg in NS IVPB 0.25 mg 50 mL  4/28/2025       Chemotherapy       oxaliplatin (ELOXATIN) 85 mg/m2 = 147 mg in D5W 529.4 mL chemo infusion       leucovorin calcium 400 mg/m2 = 690 mg in D5W 250 mL infusion       fluorouraciL injection 690 mg       fluorouracil (Adrucil) 2,400 mg/m2 = 4,150 mg in 0.9% NaCl 100 mL chemo infusion       Antiemetics       palonosetron 0.25mg/dexAMETHasone 12mg in NS IVPB 0.25 mg 50 mL  5/12/2025       Chemotherapy       oxaliplatin (ELOXATIN) 85 mg/m2 = 147 mg in D5W 529.4 mL chemo infusion       leucovorin calcium 400 mg/m2 = 690 mg in D5W 250 mL infusion       fluorouraciL injection 690 mg       fluorouracil (Adrucil) 2,400 mg/m2 = 4,150 mg in 0.9% NaCl 100 mL chemo infusion       Antiemetics       palonosetron 0.25mg/dexAMETHasone 12mg in NS IVPB 0.25 mg 50 mL    Therapy Plan Information  INF FLUIDS for Dehydration, noted on  2/26/2025  IV Fluids  sodium chloride 0.9% bolus 1,000 mL 1,000 mL  1,000 mL, Intravenous, Every visit  Flushes  sodium chloride 0.9% flush 10 mL  10 mL, Intravenous, PRN  heparin, porcine (PF) 100 unit/mL injection flush 500 Units  500 Units, Intravenous, PRN      No therapy plan of the specified type found.    No therapy plan of the specified type found.      Dragan Brian MD  [facility]  Hematology and Oncology  [facility] [address]   [place] [address]   O: [phone]  F: [phone]

## 2025-04-01 ENCOUNTER — INFUSION (OUTPATIENT)
Dept: INFUSION THERAPY | Facility: HOSPITAL | Age: 55
End: 2025-04-01
Attending: INTERNAL MEDICINE
Payer: COMMERCIAL

## 2025-04-01 VITALS
WEIGHT: 135.38 LBS | HEART RATE: 64 BPM | RESPIRATION RATE: 16 BRPM | SYSTOLIC BLOOD PRESSURE: 144 MMHG | BODY MASS INDEX: 19.42 KG/M2 | DIASTOLIC BLOOD PRESSURE: 73 MMHG

## 2025-04-01 DIAGNOSIS — C18.7 CANCER OF SIGMOID COLON: Primary | ICD-10-CM

## 2025-04-01 PROCEDURE — 25000003 PHARM REV CODE 250: Mod: PN | Performed by: INTERNAL MEDICINE

## 2025-04-01 PROCEDURE — 96411 CHEMO IV PUSH ADDL DRUG: CPT | Mod: PN

## 2025-04-01 PROCEDURE — 96415 CHEMO IV INFUSION ADDL HR: CPT | Mod: PN

## 2025-04-01 PROCEDURE — 96413 CHEMO IV INFUSION 1 HR: CPT | Mod: PN

## 2025-04-01 PROCEDURE — 96367 TX/PROPH/DG ADDL SEQ IV INF: CPT | Mod: PN

## 2025-04-01 PROCEDURE — 63600175 PHARM REV CODE 636 W HCPCS: Mod: PN | Performed by: INTERNAL MEDICINE

## 2025-04-01 PROCEDURE — 96368 THER/DIAG CONCURRENT INF: CPT | Mod: PN

## 2025-04-01 PROCEDURE — 96416 CHEMO PROLONG INFUSE W/PUMP: CPT | Mod: PN

## 2025-04-01 RX ORDER — SODIUM CHLORIDE 0.9 % (FLUSH) 0.9 %
10 SYRINGE (ML) INJECTION
Status: CANCELLED | OUTPATIENT
Start: 2025-04-01

## 2025-04-01 RX ORDER — PROCHLORPERAZINE EDISYLATE 5 MG/ML
10 INJECTION INTRAMUSCULAR; INTRAVENOUS ONCE AS NEEDED
Status: CANCELLED | OUTPATIENT
Start: 2025-04-01

## 2025-04-01 RX ORDER — HEPARIN 100 UNIT/ML
500 SYRINGE INTRAVENOUS
Status: DISCONTINUED | OUTPATIENT
Start: 2025-04-01 | End: 2025-04-01 | Stop reason: HOSPADM

## 2025-04-01 RX ORDER — HEPARIN 100 UNIT/ML
500 SYRINGE INTRAVENOUS
Status: CANCELLED | OUTPATIENT
Start: 2025-04-02

## 2025-04-01 RX ORDER — PROCHLORPERAZINE EDISYLATE 5 MG/ML
10 INJECTION INTRAMUSCULAR; INTRAVENOUS ONCE AS NEEDED
Status: DISCONTINUED | OUTPATIENT
Start: 2025-04-01 | End: 2025-04-01 | Stop reason: HOSPADM

## 2025-04-01 RX ORDER — SODIUM CHLORIDE 0.9 % (FLUSH) 0.9 %
10 SYRINGE (ML) INJECTION
Status: CANCELLED | OUTPATIENT
Start: 2025-04-02

## 2025-04-01 RX ORDER — DIPHENHYDRAMINE HYDROCHLORIDE 50 MG/ML
50 INJECTION, SOLUTION INTRAMUSCULAR; INTRAVENOUS ONCE AS NEEDED
Status: DISCONTINUED | OUTPATIENT
Start: 2025-04-01 | End: 2025-04-01 | Stop reason: HOSPADM

## 2025-04-01 RX ORDER — EPINEPHRINE 0.3 MG/.3ML
0.3 INJECTION SUBCUTANEOUS ONCE AS NEEDED
Status: CANCELLED | OUTPATIENT
Start: 2025-04-01

## 2025-04-01 RX ORDER — HEPARIN 100 UNIT/ML
500 SYRINGE INTRAVENOUS
Status: CANCELLED | OUTPATIENT
Start: 2025-04-01

## 2025-04-01 RX ORDER — DIPHENHYDRAMINE HYDROCHLORIDE 50 MG/ML
50 INJECTION, SOLUTION INTRAMUSCULAR; INTRAVENOUS ONCE AS NEEDED
Status: CANCELLED | OUTPATIENT
Start: 2025-04-01

## 2025-04-01 RX ORDER — FLUOROURACIL 50 MG/ML
400 INJECTION, SOLUTION INTRAVENOUS
Status: COMPLETED | OUTPATIENT
Start: 2025-04-01 | End: 2025-04-01

## 2025-04-01 RX ORDER — EPINEPHRINE 0.3 MG/.3ML
0.3 INJECTION SUBCUTANEOUS ONCE AS NEEDED
Status: DISCONTINUED | OUTPATIENT
Start: 2025-04-01 | End: 2025-04-01 | Stop reason: HOSPADM

## 2025-04-01 RX ORDER — SODIUM CHLORIDE 0.9 % (FLUSH) 0.9 %
10 SYRINGE (ML) INJECTION
Status: DISCONTINUED | OUTPATIENT
Start: 2025-04-01 | End: 2025-04-01 | Stop reason: HOSPADM

## 2025-04-01 RX ORDER — PROCHLORPERAZINE EDISYLATE 5 MG/ML
10 INJECTION INTRAMUSCULAR; INTRAVENOUS ONCE AS NEEDED
Status: CANCELLED | OUTPATIENT
Start: 2025-04-02

## 2025-04-01 RX ORDER — FLUOROURACIL 50 MG/ML
400 INJECTION, SOLUTION INTRAVENOUS
Status: CANCELLED | OUTPATIENT
Start: 2025-04-01

## 2025-04-01 RX ADMIN — LEUCOVORIN CALCIUM 690 MG: 350 INJECTION, POWDER, LYOPHILIZED, FOR SUSPENSION INTRAMUSCULAR; INTRAVENOUS at 12:04

## 2025-04-01 RX ADMIN — FLUOROURACIL 4000 MG: 50 INJECTION, SOLUTION INTRAVENOUS at 04:04

## 2025-04-01 RX ADMIN — SODIUM CHLORIDE 0.25 MG: 9 INJECTION, SOLUTION INTRAVENOUS at 12:04

## 2025-04-01 RX ADMIN — DEXTROSE MONOHYDRATE: 5 INJECTION, SOLUTION INTRAVENOUS at 12:04

## 2025-04-01 RX ADMIN — OXALIPLATIN 147 MG: 5 INJECTION, SOLUTION INTRAVENOUS at 12:04

## 2025-04-01 RX ADMIN — FLUOROURACIL 690 MG: 50 INJECTION, SOLUTION INTRAVENOUS at 04:04

## 2025-04-01 NOTE — PLAN OF CARE
Pt ambulated off unit with daughter at her side. Spill kit in hand and specific instructions if anything would come apart to clamp the 3 clamps we reviewed.

## 2025-04-03 ENCOUNTER — DOCUMENTATION ONLY (OUTPATIENT)
Dept: INFUSION THERAPY | Facility: HOSPITAL | Age: 55
End: 2025-04-03
Payer: COMMERCIAL

## 2025-04-03 ENCOUNTER — INFUSION (OUTPATIENT)
Dept: INFUSION THERAPY | Facility: HOSPITAL | Age: 55
End: 2025-04-03
Attending: INTERNAL MEDICINE
Payer: COMMERCIAL

## 2025-04-03 VITALS
TEMPERATURE: 98 F | BODY MASS INDEX: 19.34 KG/M2 | HEART RATE: 87 BPM | DIASTOLIC BLOOD PRESSURE: 72 MMHG | SYSTOLIC BLOOD PRESSURE: 110 MMHG | HEIGHT: 70 IN | RESPIRATION RATE: 16 BRPM | OXYGEN SATURATION: 99 % | WEIGHT: 135.13 LBS

## 2025-04-03 DIAGNOSIS — C18.7 CANCER OF SIGMOID COLON: Primary | ICD-10-CM

## 2025-04-03 DIAGNOSIS — E86.0 DEHYDRATION: ICD-10-CM

## 2025-04-03 PROCEDURE — 25000003 PHARM REV CODE 250: Mod: PN | Performed by: INTERNAL MEDICINE

## 2025-04-03 PROCEDURE — A4216 STERILE WATER/SALINE, 10 ML: HCPCS | Mod: PN | Performed by: INTERNAL MEDICINE

## 2025-04-03 PROCEDURE — 25000003 PHARM REV CODE 250: Mod: PN

## 2025-04-03 PROCEDURE — 96360 HYDRATION IV INFUSION INIT: CPT | Mod: PN

## 2025-04-03 RX ORDER — SODIUM CHLORIDE 0.9 % (FLUSH) 0.9 %
10 SYRINGE (ML) INJECTION
OUTPATIENT
Start: 2025-04-03

## 2025-04-03 RX ORDER — HEPARIN 100 UNIT/ML
500 SYRINGE INTRAVENOUS
OUTPATIENT
Start: 2025-04-03

## 2025-04-03 RX ORDER — PROCHLORPERAZINE EDISYLATE 5 MG/ML
10 INJECTION INTRAMUSCULAR; INTRAVENOUS ONCE AS NEEDED
Status: DISCONTINUED | OUTPATIENT
Start: 2025-04-03 | End: 2025-04-03 | Stop reason: HOSPADM

## 2025-04-03 RX ORDER — SODIUM CHLORIDE 0.9 % (FLUSH) 0.9 %
10 SYRINGE (ML) INJECTION
Status: DISCONTINUED | OUTPATIENT
Start: 2025-04-03 | End: 2025-04-03 | Stop reason: HOSPADM

## 2025-04-03 RX ADMIN — SODIUM CHLORIDE 1000 ML: 9 INJECTION, SOLUTION INTRAVENOUS at 01:04

## 2025-04-03 RX ADMIN — Medication 10 ML: at 02:04

## 2025-04-03 NOTE — PLAN OF CARE
Problem: Adult Inpatient Plan of Care  Goal: Plan of Care Review  Outcome: Progressing  Flowsheets (Taken 4/3/2025 1425)  Plan of Care Reviewed With:   patient   child  Goal: Patient-Specific Goal (Individualized)  Outcome: Progressing  Flowsheets (Taken 4/3/2025 1425)  Individualized Care Needs: education, conversation, recliner, blanket, pillow, tv, daughter at chairside, req ivfs  Anxieties, Fears or Concerns: none voiced  Goal: Optimal Comfort and Wellbeing  Outcome: Progressing  Intervention: Provide Person-Centered Care  Flowsheets (Taken 4/3/2025 1425)  Trust Relationship/Rapport:   care explained   empathic listening provided   reassurance provided   thoughts/feelings acknowledged   questions answered   choices provided   emotional support provided   questions encouraged     Problem: Fatigue  Goal: Improved Activity Tolerance  Outcome: Progressing  Intervention: Promote Improved Energy  Flowsheets (Taken 4/3/2025 1425)  Fatigue Management:   fatigue-related activity identified   paced activity encouraged   frequent rest breaks encouraged  Sleep/Rest Enhancement:   relaxation techniques promoted   noise level reduced   family presence promoted   therapeutic touch utilized   natural light exposure provided  Activity Management:   Ambulated -L4   Up in chair - L3  Environmental Support:   rest periods encouraged   distractions minimized   calm environment promoted   personal routine supported

## 2025-04-03 NOTE — PLAN OF CARE
Pt arrived to clinic for 5fu pump d/c and requesting fluids due to fatigue. Pt given 1L NS over 1 hour and tolerated well. Pt with no aware of f/u appts and number to call for any needs and discharged to home in NAD with daughter.

## 2025-04-03 NOTE — PROGRESS NOTES
SW met with pt and her daughter before her pump DC. Pt reports she has been feeling fatigued with her treatment. Pt said when she is on  her pump she does not go to work. She denied financial worries at his time stating she has received some donations to help at this time.     Pt's daughter is present and said she has encouraged her mom to move in with her but pt is not sure if she will do this. SW validated it is a big decision.     Jasmin Peterson, ANTW

## 2025-04-14 ENCOUNTER — LAB VISIT (OUTPATIENT)
Dept: LAB | Facility: HOSPITAL | Age: 55
End: 2025-04-14
Attending: INTERNAL MEDICINE
Payer: COMMERCIAL

## 2025-04-14 DIAGNOSIS — C18.7 CANCER OF SIGMOID COLON: ICD-10-CM

## 2025-04-14 PROBLEM — D64.9 NORMOCYTIC ANEMIA: Status: ACTIVE | Noted: 2025-04-14

## 2025-04-14 PROBLEM — D84.821 IMMUNODEFICIENCY DUE TO CHEMOTHERAPY: Status: ACTIVE | Noted: 2025-04-14

## 2025-04-14 PROBLEM — T45.1X5A IMMUNODEFICIENCY DUE TO CHEMOTHERAPY: Status: ACTIVE | Noted: 2025-04-14

## 2025-04-14 PROBLEM — Z79.69 IMMUNODEFICIENCY DUE TO CHEMOTHERAPY: Status: ACTIVE | Noted: 2025-04-14

## 2025-04-14 LAB
ABSOLUTE EOSINOPHIL (OHS): 0.22 K/UL
ABSOLUTE MONOCYTE (OHS): 0.59 K/UL (ref 0.3–1)
ABSOLUTE NEUTROPHIL COUNT (OHS): 2.76 K/UL (ref 1.8–7.7)
ALBUMIN SERPL BCP-MCNC: 3.8 G/DL (ref 3.5–5.2)
ALP SERPL-CCNC: 93 UNIT/L (ref 40–150)
ALT SERPL W/O P-5'-P-CCNC: 24 UNIT/L (ref 10–44)
ANION GAP (OHS): 7 MMOL/L (ref 8–16)
AST SERPL-CCNC: 25 UNIT/L (ref 11–45)
BASOPHILS # BLD AUTO: 0.05 K/UL
BASOPHILS NFR BLD AUTO: 1 %
BILIRUB SERPL-MCNC: 0.4 MG/DL (ref 0.1–1)
BUN SERPL-MCNC: 12 MG/DL (ref 6–20)
CALCIUM SERPL-MCNC: 9.5 MG/DL (ref 8.7–10.5)
CHLORIDE SERPL-SCNC: 107 MMOL/L (ref 95–110)
CO2 SERPL-SCNC: 25 MMOL/L (ref 23–29)
CREAT SERPL-MCNC: 0.6 MG/DL (ref 0.5–1.4)
ERYTHROCYTE [DISTWIDTH] IN BLOOD BY AUTOMATED COUNT: 21.9 % (ref 11.5–14.5)
GFR SERPLBLD CREATININE-BSD FMLA CKD-EPI: >60 ML/MIN/1.73/M2
GLUCOSE SERPL-MCNC: 85 MG/DL (ref 70–110)
HCT VFR BLD AUTO: 32 % (ref 37–48.5)
HGB BLD-MCNC: 10.3 GM/DL (ref 12–16)
IMM GRANULOCYTES # BLD AUTO: 0.01 K/UL (ref 0–0.04)
IMM GRANULOCYTES NFR BLD AUTO: 0.2 % (ref 0–0.5)
LYMPHOCYTES # BLD AUTO: 1.22 K/UL (ref 1–4.8)
MAGNESIUM SERPL-MCNC: 2 MG/DL (ref 1.6–2.6)
MCH RBC QN AUTO: 30.9 PG (ref 27–31)
MCHC RBC AUTO-ENTMCNC: 32.2 G/DL (ref 32–36)
MCV RBC AUTO: 96 FL (ref 82–98)
NUCLEATED RBC (/100WBC) (OHS): 0 /100 WBC
PLATELET # BLD AUTO: 240 K/UL (ref 150–450)
PMV BLD AUTO: 9.5 FL (ref 9.2–12.9)
POTASSIUM SERPL-SCNC: 3.8 MMOL/L (ref 3.5–5.1)
PROT SERPL-MCNC: 6.8 GM/DL (ref 6–8.4)
RBC # BLD AUTO: 3.33 M/UL (ref 4–5.4)
RELATIVE EOSINOPHIL (OHS): 4.5 %
RELATIVE LYMPHOCYTE (OHS): 25.2 % (ref 18–48)
RELATIVE MONOCYTE (OHS): 12.2 % (ref 4–15)
RELATIVE NEUTROPHIL (OHS): 56.9 % (ref 38–73)
SODIUM SERPL-SCNC: 139 MMOL/L (ref 136–145)
WBC # BLD AUTO: 4.85 K/UL (ref 3.9–12.7)

## 2025-04-14 PROCEDURE — 83735 ASSAY OF MAGNESIUM: CPT | Mod: PN

## 2025-04-14 PROCEDURE — 85025 COMPLETE CBC W/AUTO DIFF WBC: CPT | Mod: PN

## 2025-04-14 PROCEDURE — 36415 COLL VENOUS BLD VENIPUNCTURE: CPT | Mod: PN

## 2025-04-14 PROCEDURE — 82040 ASSAY OF SERUM ALBUMIN: CPT | Mod: PN

## 2025-04-14 NOTE — PROGRESS NOTES
PATIENT: Abbi Snider  MRN: [id number]  DATE: 4/15/2025      Diagnosis:   1. Cancer of sigmoid colon    2. Immunodeficiency due to chemotherapy    3. Normocytic anemia    4. Mass of left ankle      Chief Complaint: Colon Cancer (Treatment clearance)    Subjective:    Interval History: Ms. Snider is a 54 y.o. female presents with colon cancer.  Since the last clinic visit the patient, the patient endorses mild cold sensitivity that resolves. Patient endorses 2 small nodules noted near left ankle. Endorses pain with palpation. Denies any history of trauma that she recalls. There is no redness or swelling in the area.  The patient denies CP, cough, SOB, abdominal pain, nausea, vomiting, constipation, diarrhea.  The patient denies fever, chills, night sweats, new lumps or bumps, easy bruising or bleeding.    Patient notes improvement in the peeling of her skin to feet and hands - using urea cream.    Prior History:  The patient initially underwent colonoscopy on 12/09/2024 for workup of bright red blood per rectum.  Colonoscopy showed a partially obstructing tumor in the distal sigmoid colon.  Pathology from the procedure showed moderately differentiated adenocarcinoma with intact nuclear expression of MLH1, PMS2, MSH2, and MSH6.  CT of the abdomen and pelvis on 12/11/2024 showed multiple hepatic hypodensities many which too small to characterize with the largest measuring 10 mm in the lateral segment of the left hepatic lobe; circumferential colonic wall thickening approximately 5.6 cm in length within the sigmoid colon; and a hypodense mesenteric lymph node measuring 17 x 19 mm.  CT chest on 12/18/2024 showed no evidence of metastatic disease in the chest.  The patient underwent a robotic hemicolectomy under the care of Dr Brandt on 12/23/24 with path showing adenocarcinoma, moderately differentiated, measuring 5.8 x 3.5 x 1.6 cm with invasion through the bowel wall just into the fat, negative margins, 2/24  lymph nodes positive pT3 pN1b.   Pharmacogenomic testing showed normal DPYD metabolism.  MRI [facility] on 25 showed SHARONA.    Past Medical History:   Past Medical History:   Diagnosis Date    Colon cancer     Murmur, cardiac     since childhood       Past Surgical HIstory:   Past Surgical History:   Procedure Laterality Date     SECTION, CLASSIC      COLONOSCOPY N/A 2024    Procedure: COLONOSCOPY;  Surgeon: Graeme Donahue MD;  Location: [facility];  Service: Gastroenterology;  Laterality: N/A;    DV5 ROBOTIC COLECTOMY, SIGMOID Left 2024    Procedure: DV5 ROBOTIC COLECTOMY, SIGMOID W/ERAS;  Surgeon: ETIENNE Brandt MD;  Location: [facility];  Service: Colon and Rectal;  Laterality: Left;    FLEXIBLE SIGMOIDOSCOPY N/A 2024    Procedure: SIGMOIDOSCOPY, FLEXIBLE;  Surgeon: ETIENNE Brandt MD;  Location: [facility];  Service: Colon and Rectal;  Laterality: N/A;    INSERTION OF TUNNELED CENTRAL VENOUS CATHETER (CVC) WITH SUBCUTANEOUS PORT N/A 2025    Procedure: HCSVRXISY-CQTA-T-CATH;  Surgeon: Terry Petit MD;  Location: [facility];  Service: General;  Laterality: N/A;    MOBILIZATION OF SPLENIC FLEXURE N/A 2024    Procedure: MOBILIZATION, SPLENIC FLEXURE;  Surgeon: ETIENNE Brandt MD;  Location: [facility];  Service: Colon and Rectal;  Laterality: N/A;    WISDOM TOOTH EXTRACTION         Family History:   Family History   Problem Relation Name Age of Onset    Cancer Mother          liver due to transplant med    Heart disease Father      Breast cancer Maternal Grandmother      Cancer Maternal Grandmother  41        breast cancer       Social History:  reports that she has never smoked. She has never been exposed to tobacco smoke. She has never used smokeless tobacco. She reports that she does not currently use alcohol. She reports that she does not use drugs.    Allergies:  Review of patient's allergies indicates:   Allergen Reactions    Contrast media Rash       Medications:  Current  Outpatient Medications   Medication Sig Dispense Refill    capecitabine (XELODA) 500 MG Tab Take 3 tablets (1,500 mg total) by mouth 2 (two) times daily on days 1-14 of a 21 day cycle. (Patient not taking: Reported on 4/15/2025) 84 tablet 5    ibuprofen (ADVIL,MOTRIN) 600 MG tablet Take 1 tablet (600 mg total) by mouth 3 (three) times daily. (Patient not taking: Reported on 4/15/2025) 15 tablet 0    Lactobacillus rhamnosus GG (CULTURELLE) 10 billion cell capsule Take 1 capsule by mouth once daily. (Patient not taking: Reported on 4/15/2025) 20 capsule 0    LIDOcaine-prilocaine (EMLA) cream Apply topically as needed (apply 30 to 60 minutes prior to chemo). (Patient not taking: Reported on 3/28/2025) 30 g 2    magnesium oxide (MAG-OX) 400 mg (241.3 mg magnesium) tablet Take 1 tablet (400 mg total) by mouth once daily. (Patient not taking: Reported on 3/28/2025) 7 tablet 0    multivit-mins no.63/iron/folic (M-VIT ORAL) Take 1 tablet by mouth once daily. (Patient not taking: Reported on 4/15/2025)      OLANZapine (ZYPREXA) 5 MG tablet Take 1 tablet (5 mg total) by mouth every evening. Take nightly on days 1-3 of each chemotherapy cycle. (Patient not taking: Reported on 4/15/2025) 6 tablet 11    ondansetron (ZOFRAN-ODT) 4 MG TbDL Take 1 tablet (4 mg total) by mouth every 6 (six) hours as needed (nausea). (Patient not taking: Reported on 4/15/2025) 60 tablet 3    oxyCODONE (ROXICODONE) 5 MG immediate release tablet Take 1 tablet (5 mg total) by mouth every 4 (four) hours as needed for Pain. (Patient not taking: Reported on 4/15/2025) 30 tablet 0    potassium chloride SA (K-DUR,KLOR-CON) 20 MEQ tablet Take 1 tablet (20 mEq total) by mouth once daily. (Patient not taking: Reported on 4/15/2025) 3 tablet 0    prochlorperazine (COMPAZINE) 10 MG tablet Take 1 tablet (10 mg total) by mouth every 6 (six) hours as needed. (Patient not taking: Reported on 4/15/2025) 30 tablet 1    prochlorperazine (COMPAZINE) 10 MG tablet Take 1  "tablet (10 mg total) by mouth every 6 (six) hours as needed. (Patient not taking: Reported on 4/15/2025) 15 tablet 0    promethazine (PHENERGAN) 25 MG tablet Take 1 tablet (25 mg total) by mouth every 4 (four) hours. (Patient not taking: Reported on 4/15/2025) 60 tablet 3    urea (CARMOL) 40 % Crea Apply topically 2 (two) times daily. (Patient not taking: Reported on 4/15/2025) 85 g 3     No current facility-administered medications for this visit.       Review of Systems   Constitutional:  Positive for unexpected weight change. Negative for chills, fatigue and fever.   Respiratory:  Negative for cough and shortness of breath.    Cardiovascular:  Negative for chest pain and palpitations.   Gastrointestinal:  Negative for abdominal pain, constipation, diarrhea, nausea and vomiting.   Musculoskeletal:         Nodules noted near left ankle - tenderness with palpation   Skin:  Negative for rash.   Neurological:  Negative for headaches.   Hematological:  Negative for adenopathy. Does not bruise/bleed easily.       ECOG Performance Status: 0   Objective:      Vitals:   Vitals:    04/15/25 0855   BP: 109/68   BP Location: Right arm   Patient Position: Sitting   Pulse: (!) 111   Resp: 18   Temp: 97.7 °F (36.5 °C)   TempSrc: Temporal   SpO2: 99%   Weight: 61.1 kg (134 lb 11.2 oz)   Height: 5' 10" (1.778 m)     Wt Readings from Last 3 Encounters:   04/15/25 61.1 kg (134 lb 11.2 oz)   04/03/25 61.3 kg (135 lb 2.3 oz)   04/01/25 61.4 kg (135 lb 5.8 oz)         Physical Exam  Constitutional:       General: She is not in acute distress.     Appearance: She is well-developed. She is not diaphoretic.   HENT:      Head: Normocephalic and atraumatic.   Eyes:      General: No scleral icterus.  Cardiovascular:      Rate and Rhythm: Normal rate and regular rhythm.      Heart sounds: Normal heart sounds. No murmur heard.  Pulmonary:      Effort: Pulmonary effort is normal. No respiratory distress.      Breath sounds: Normal breath " sounds. No wheezing.   Abdominal:      General: Bowel sounds are normal. There is no distension.      Palpations: Abdomen is soft.      Tenderness: There is no abdominal tenderness.   Musculoskeletal:         General: Tenderness (to palpation of LLE nodules near ankle) present.      Right lower leg: No edema.      Left lower leg: No edema.   Lymphadenopathy:      Cervical: No cervical adenopathy.      Upper Body:      Right upper body: No supraclavicular or axillary adenopathy.      Left upper body: No supraclavicular or axillary adenopathy.   Skin:     General: Skin is warm.      Findings: No erythema or rash.   Neurological:      Mental Status: She is alert and oriented to person, place, and time.   Psychiatric:         Behavior: Behavior normal.         Thought Content: Thought content normal.         Laboratory Data:  Lab Visit on 04/14/2025   Component Date Value Ref Range Status    Sodium 04/14/2025 139  136 - 145 mmol/L Final    Potassium 04/14/2025 3.8  3.5 - 5.1 mmol/L Final    Chloride 04/14/2025 107  95 - 110 mmol/L Final    CO2 04/14/2025 25  23 - 29 mmol/L Final    Glucose 04/14/2025 85  70 - 110 mg/dL Final    BUN 04/14/2025 12  6 - 20 mg/dL Final    Creatinine 04/14/2025 0.6  0.5 - 1.4 mg/dL Final    Calcium 04/14/2025 9.5  8.7 - 10.5 mg/dL Final    Protein Total 04/14/2025 6.8  6.0 - 8.4 gm/dL Final    Albumin 04/14/2025 3.8  3.5 - 5.2 g/dL Final    Bilirubin Total 04/14/2025 0.4  0.1 - 1.0 mg/dL Final    For infants and newborns, interpretation of results should be based   on gestational age, weight and in agreement with clinical   observations.    Premature Infant recommended reference ranges:   0-24 hours:  <8.0 mg/dL   24-48 hours: <12.0 mg/dL   3-5 days:    <15.0 mg/dL   6-29 days:   <15.0 mg/dL    ALP 04/14/2025 93  40 - 150 unit/L Final    AST 04/14/2025 25  11 - 45 unit/L Final    ALT 04/14/2025 24  10 - 44 unit/L Final    Anion Gap 04/14/2025 7 (L)  8 - 16 mmol/L Final    eGFR 04/14/2025 >60   >60 mL/min/1.73/m2 Final    Estimated GFR calculated using the CKD-EPI creatinine (2021) equation.    Magnesium  04/14/2025 2.0  1.6 - 2.6 mg/dL Final    WBC 04/14/2025 4.85  3.90 - 12.70 K/uL Final    RBC 04/14/2025 3.33 (L)  4.00 - 5.40 M/uL Final    HGB 04/14/2025 10.3 (L)  12.0 - 16.0 gm/dL Final    HCT 04/14/2025 32.0 (L)  37.0 - 48.5 % Final    MCV 04/14/2025 96  82 - 98 fL Final    MCH 04/14/2025 30.9  27.0 - 31.0 pg Final    MCHC 04/14/2025 32.2  32.0 - 36.0 g/dL Final    RDW 04/14/2025 21.9 (H)  11.5 - 14.5 % Final    Platelet Count 04/14/2025 240  150 - 450 K/uL Final    MPV 04/14/2025 9.5  9.2 - 12.9 fL Final    Nucleated RBC 04/14/2025 0  <=0 /100 WBC Final    Neut % 04/14/2025 56.9  38 - 73 % Final    Lymph % 04/14/2025 25.2  18 - 48 % Final    Mono % 04/14/2025 12.2  4 - 15 % Final    Eos % 04/14/2025 4.5  <=8 % Final    Basophil % 04/14/2025 1.0  <=1.9 % Final    Imm Grans % 04/14/2025 0.2  0.0 - 0.5 % Final    Neut # 04/14/2025 2.76  1.8 - 7.7 K/uL Final    Lymph # 04/14/2025 1.22  1 - 4.8 K/uL Final    Mono # 04/14/2025 0.59  0.3 - 1 K/uL Final    Eos # 04/14/2025 0.22  <=0.5 K/uL Final    Baso # 04/14/2025 0.05  <=0.2 K/uL Final    Imm Grans # 04/14/2025 0.01  0.00 - 0.04 K/uL Final    Mild elevation in immature granulocytes is non specific and can be seen in a variety of conditions including stress response, acute inflammation, trauma and pregnancy. Correlation with other laboratory and clinical findings is essential.         Imaging:     MRI Abdomen 1/18/25    The liver is normal in size and contour.  A few tiny cysts are scattered about the hepatic parenchyma.  There are no suspicious hepatic lesions.     The pancreas, spleen, adrenal glands, and kidneys are unremarkable.  The patient's known sigmoid mass is partially visualized on some sequences.       Assessment:       1. Cancer of sigmoid colon    2. Immunodeficiency due to chemotherapy    3. Normocytic anemia    4. 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 diagnosed on hemicolectomy 12/23/2024   -The patient with intact nuclear expression of MLH1, PMS2, MSH2, and MSH6  -The patient with nodule seen in the liver on CT abdomen 12/11/2024   -MRI abdomen 1/18/25 showed cysts in the liver with no concern for mets  -Pharmacogenomic panel showed normal DPYD metabolism  -Pt was to be on CAPOX for 3 months  -Capecitabine dosing 850mg/mm2 twice daily for 14/21 day cycle  -Pt completed 2 cycles capecitabine but has had significant symptoms on treatment  -Pt wishes to switch to FOLFOX  -Will plan on 3 FOLFOX treatments   -Proceed with cycle 2 FOLFOX 4/15/25    Immunodeficiency due to chemo - pt at increased risk of infection  -No current signs of infection  -Will monitor    Normocytic Anemia - Hemoglobin 10.3g/dL on 4/14/25  -Ferritin 22ng/mL on 2/07/25 with normal TIBC  -B12 and folate normal  -Will monitor    Nodules left ankle - cysts?  -will order US of left LE to further investigate 2 palpated nodules    Route Chart for Scheduling    Med Onc Chart Routing      Follow up with physician . As scheduled for CBC, CMP and mag on 4/29/25 prior to seeing Dr. Brian with treatment to follow.   Follow up with [facility]    Infusion scheduling note    Injection scheduling note    Labs    Imaging    Pharmacy appointment    Other referrals              Treatment Plan Information   OP GI [id number] (oxaliplatin leucovorin fluorouracil) Q2W Dragan Brian MD   Associated diagnosis: Cancer of sigmoid colon Stage IIIB pT3, pN1b, cM0 noted on 12/18/2024   Line of treatment: Adjuvant  Treatment Goal: Curative     Upcoming Treatment Dates - OP GI [id number] (oxaliplatin leucovorin fluorouracil) Q2W    4/14/2025       Chemotherapy       oxaliplatin (ELOXATIN) in D5W 529.6 mL chemo infusion       leucovorin calcium in D5W 250 mL infusion       fluorouraciL injection       fluorouracil chemo infusion        Antiemetics       palonosetron 0.25mg/dexAMETHasone 12mg in NS IVPB 0.25 mg 50 mL  4/28/2025       Chemotherapy       oxaliplatin (ELOXATIN) in D5W 529.6 mL chemo infusion       leucovorin calcium in D5W 250 mL infusion       fluorouraciL injection       fluorouracil chemo infusion       Antiemetics       palonosetron 0.25mg/dexAMETHasone 12mg in NS IVPB 0.25 mg 50 mL    Therapy Plan Information  INF FLUIDS for Dehydration, noted on 2/26/2025  IV Fluids  sodium chloride 0.9% bolus 1,000 mL 1,000 mL  1,000 mL, Intravenous, Every visit  Flushes  sodium chloride 0.9% flush 10 mL  10 mL, Intravenous, PRN  heparin, porcine (PF) 100 unit/mL injection flush 500 Units  500 Units, Intravenous, PRN      No therapy plan of the specified type found.    No therapy plan of the specified type found.    Visit today included increased complexity associated with the care of the episodic problem (chemotherapy) addressed and managing the longitudinal care of the patient due to the serious and/or complex managed problem(s) colon cancer.    TIERA Lovelace  Hematology and Medical Oncology  [facility]  [facility]    38 minutes of total time spent on the encounter, which includes face to face time and non-face to face time preparing to see the patient (eg, review of tests), Obtaining and/or reviewing separately obtained history, documenting clinical information in the electronic or other health record, independently interpreting results if documented above (not separately reported) and communicating results to the patient/family/caregiver, or Care coordination (not separately reported).

## 2025-04-15 ENCOUNTER — DOCUMENTATION ONLY (OUTPATIENT)
Dept: INFUSION THERAPY | Facility: HOSPITAL | Age: 55
End: 2025-04-15
Payer: COMMERCIAL

## 2025-04-15 ENCOUNTER — OFFICE VISIT (OUTPATIENT)
Dept: HEMATOLOGY/ONCOLOGY | Facility: CLINIC | Age: 55
End: 2025-04-15
Payer: COMMERCIAL

## 2025-04-15 ENCOUNTER — INFUSION (OUTPATIENT)
Dept: INFUSION THERAPY | Facility: HOSPITAL | Age: 55
End: 2025-04-15
Attending: INTERNAL MEDICINE
Payer: COMMERCIAL

## 2025-04-15 VITALS
HEIGHT: 70 IN | WEIGHT: 134.69 LBS | SYSTOLIC BLOOD PRESSURE: 109 MMHG | HEART RATE: 111 BPM | TEMPERATURE: 98 F | OXYGEN SATURATION: 99 % | BODY MASS INDEX: 19.28 KG/M2 | RESPIRATION RATE: 18 BRPM | DIASTOLIC BLOOD PRESSURE: 68 MMHG

## 2025-04-15 VITALS
DIASTOLIC BLOOD PRESSURE: 68 MMHG | HEIGHT: 70 IN | SYSTOLIC BLOOD PRESSURE: 109 MMHG | WEIGHT: 134.69 LBS | TEMPERATURE: 98 F | BODY MASS INDEX: 19.28 KG/M2 | RESPIRATION RATE: 18 BRPM | OXYGEN SATURATION: 99 % | HEART RATE: 111 BPM

## 2025-04-15 DIAGNOSIS — R22.42 MASS OF LEFT ANKLE: ICD-10-CM

## 2025-04-15 DIAGNOSIS — C18.7 CANCER OF SIGMOID COLON: Primary | ICD-10-CM

## 2025-04-15 DIAGNOSIS — E86.0 DEHYDRATION: ICD-10-CM

## 2025-04-15 DIAGNOSIS — D64.9 NORMOCYTIC ANEMIA: ICD-10-CM

## 2025-04-15 DIAGNOSIS — T45.1X5A IMMUNODEFICIENCY DUE TO CHEMOTHERAPY: ICD-10-CM

## 2025-04-15 DIAGNOSIS — D84.821 IMMUNODEFICIENCY DUE TO CHEMOTHERAPY: ICD-10-CM

## 2025-04-15 DIAGNOSIS — Z79.69 IMMUNODEFICIENCY DUE TO CHEMOTHERAPY: ICD-10-CM

## 2025-04-15 PROCEDURE — 96413 CHEMO IV INFUSION 1 HR: CPT | Mod: PN

## 2025-04-15 PROCEDURE — 1160F RVW MEDS BY RX/DR IN RCRD: CPT | Mod: CPTII,S$GLB,, | Performed by: NURSE PRACTITIONER

## 2025-04-15 PROCEDURE — 3008F BODY MASS INDEX DOCD: CPT | Mod: CPTII,S$GLB,, | Performed by: NURSE PRACTITIONER

## 2025-04-15 PROCEDURE — 3074F SYST BP LT 130 MM HG: CPT | Mod: CPTII,S$GLB,, | Performed by: NURSE PRACTITIONER

## 2025-04-15 PROCEDURE — 1159F MED LIST DOCD IN RCRD: CPT | Mod: CPTII,S$GLB,, | Performed by: NURSE PRACTITIONER

## 2025-04-15 PROCEDURE — 63600175 PHARM REV CODE 636 W HCPCS: Mod: PN | Performed by: NURSE PRACTITIONER

## 2025-04-15 PROCEDURE — 3078F DIAST BP <80 MM HG: CPT | Mod: CPTII,S$GLB,, | Performed by: NURSE PRACTITIONER

## 2025-04-15 PROCEDURE — 96368 THER/DIAG CONCURRENT INF: CPT | Mod: PN

## 2025-04-15 PROCEDURE — 25000003 PHARM REV CODE 250: Mod: PN | Performed by: NURSE PRACTITIONER

## 2025-04-15 PROCEDURE — 96411 CHEMO IV PUSH ADDL DRUG: CPT | Mod: PN

## 2025-04-15 PROCEDURE — 99214 OFFICE O/P EST MOD 30 MIN: CPT | Mod: S$GLB,,, | Performed by: NURSE PRACTITIONER

## 2025-04-15 PROCEDURE — 96367 TX/PROPH/DG ADDL SEQ IV INF: CPT | Mod: PN

## 2025-04-15 PROCEDURE — G2211 COMPLEX E/M VISIT ADD ON: HCPCS | Mod: S$GLB,,, | Performed by: NURSE PRACTITIONER

## 2025-04-15 PROCEDURE — 96415 CHEMO IV INFUSION ADDL HR: CPT | Mod: PN

## 2025-04-15 PROCEDURE — 99999 PR PBB SHADOW E&M-EST. PATIENT-LVL V: CPT | Mod: PBBFAC,,, | Performed by: NURSE PRACTITIONER

## 2025-04-15 PROCEDURE — 25000003 PHARM REV CODE 250: Mod: PN

## 2025-04-15 PROCEDURE — 1111F DSCHRG MED/CURRENT MED MERGE: CPT | Mod: CPTII,S$GLB,, | Performed by: NURSE PRACTITIONER

## 2025-04-15 RX ORDER — HEPARIN 100 UNIT/ML
500 SYRINGE INTRAVENOUS
OUTPATIENT
Start: 2025-04-15

## 2025-04-15 RX ORDER — EPINEPHRINE 0.3 MG/.3ML
0.3 INJECTION SUBCUTANEOUS ONCE AS NEEDED
Status: DISCONTINUED | OUTPATIENT
Start: 2025-04-15 | End: 2025-04-15 | Stop reason: HOSPADM

## 2025-04-15 RX ORDER — SODIUM CHLORIDE 0.9 % (FLUSH) 0.9 %
10 SYRINGE (ML) INJECTION
OUTPATIENT
Start: 2025-04-15

## 2025-04-15 RX ORDER — SODIUM CHLORIDE 0.9 % (FLUSH) 0.9 %
10 SYRINGE (ML) INJECTION
Status: CANCELLED | OUTPATIENT
Start: 2025-04-15

## 2025-04-15 RX ORDER — SODIUM CHLORIDE 0.9 % (FLUSH) 0.9 %
10 SYRINGE (ML) INJECTION
Status: DISCONTINUED | OUTPATIENT
Start: 2025-04-15 | End: 2025-04-15 | Stop reason: HOSPADM

## 2025-04-15 RX ORDER — PROCHLORPERAZINE EDISYLATE 5 MG/ML
10 INJECTION INTRAMUSCULAR; INTRAVENOUS ONCE AS NEEDED
Status: DISCONTINUED | OUTPATIENT
Start: 2025-04-15 | End: 2025-04-15 | Stop reason: HOSPADM

## 2025-04-15 RX ORDER — DIPHENHYDRAMINE HYDROCHLORIDE 50 MG/ML
50 INJECTION, SOLUTION INTRAMUSCULAR; INTRAVENOUS ONCE AS NEEDED
Status: DISCONTINUED | OUTPATIENT
Start: 2025-04-15 | End: 2025-04-15 | Stop reason: HOSPADM

## 2025-04-15 RX ORDER — DIPHENHYDRAMINE HYDROCHLORIDE 50 MG/ML
50 INJECTION, SOLUTION INTRAMUSCULAR; INTRAVENOUS ONCE AS NEEDED
Status: CANCELLED | OUTPATIENT
Start: 2025-04-15

## 2025-04-15 RX ORDER — FLUOROURACIL 50 MG/ML
400 INJECTION, SOLUTION INTRAVENOUS
Status: COMPLETED | OUTPATIENT
Start: 2025-04-15 | End: 2025-04-15

## 2025-04-15 RX ORDER — PROCHLORPERAZINE EDISYLATE 5 MG/ML
10 INJECTION INTRAMUSCULAR; INTRAVENOUS ONCE AS NEEDED
Status: CANCELLED | OUTPATIENT
Start: 2025-04-16

## 2025-04-15 RX ORDER — PROCHLORPERAZINE EDISYLATE 5 MG/ML
10 INJECTION INTRAMUSCULAR; INTRAVENOUS ONCE AS NEEDED
Status: CANCELLED | OUTPATIENT
Start: 2025-04-15

## 2025-04-15 RX ORDER — HEPARIN 100 UNIT/ML
500 SYRINGE INTRAVENOUS
Status: CANCELLED | OUTPATIENT
Start: 2025-04-16

## 2025-04-15 RX ORDER — FLUOROURACIL 50 MG/ML
400 INJECTION, SOLUTION INTRAVENOUS
Status: CANCELLED | OUTPATIENT
Start: 2025-04-15

## 2025-04-15 RX ORDER — HEPARIN 100 UNIT/ML
500 SYRINGE INTRAVENOUS
Status: CANCELLED | OUTPATIENT
Start: 2025-04-15

## 2025-04-15 RX ORDER — SODIUM CHLORIDE 0.9 % (FLUSH) 0.9 %
10 SYRINGE (ML) INJECTION
Status: CANCELLED | OUTPATIENT
Start: 2025-04-16

## 2025-04-15 RX ORDER — HEPARIN 100 UNIT/ML
500 SYRINGE INTRAVENOUS
Status: DISCONTINUED | OUTPATIENT
Start: 2025-04-15 | End: 2025-04-15 | Stop reason: HOSPADM

## 2025-04-15 RX ORDER — EPINEPHRINE 0.3 MG/.3ML
0.3 INJECTION SUBCUTANEOUS ONCE AS NEEDED
Status: CANCELLED | OUTPATIENT
Start: 2025-04-15

## 2025-04-15 RX ADMIN — FLUOROURACIL 4000 MG: 50 INJECTION, SOLUTION INTRAVENOUS at 01:04

## 2025-04-15 RX ADMIN — DEXAMETHASONE SODIUM PHOSPHATE 0.25 MG: 10 INJECTION, SOLUTION INTRAMUSCULAR; INTRAVENOUS at 09:04

## 2025-04-15 RX ADMIN — SODIUM CHLORIDE 1000 ML: 9 INJECTION, SOLUTION INTRAVENOUS at 01:04

## 2025-04-15 RX ADMIN — FLUOROURACIL 695 MG: 50 INJECTION, SOLUTION INTRAVENOUS at 01:04

## 2025-04-15 RX ADMIN — OXALIPLATIN 148 MG: 5 INJECTION, SOLUTION INTRAVENOUS at 10:04

## 2025-04-15 RX ADMIN — SODIUM CHLORIDE: 9 INJECTION, SOLUTION INTRAVENOUS at 09:04

## 2025-04-15 RX ADMIN — LEUCOVORIN CALCIUM 695 MG: 350 INJECTION, POWDER, LYOPHILIZED, FOR SUSPENSION INTRAMUSCULAR; INTRAVENOUS at 10:04

## 2025-04-15 RX ADMIN — DEXTROSE MONOHYDRATE: 5 INJECTION, SOLUTION INTRAVENOUS at 10:04

## 2025-04-15 NOTE — PLAN OF CARE
"Tolerated FOLFOX well.  No reactions noted.  No questions or concerns at this time.  5-FU pump infusing upon d/c, instructed pt to check pump screen twice daily for "RUN", RTC this Thursday for pump d/c, verbalized understanding.  Pump setting verified by 2 RNs.  Ambulated off unit in NAD.  "

## 2025-04-15 NOTE — PROGRESS NOTES
Oncology Nutrition   Chemotherapy Infusion Visit    Nutrition Follow Up   RD met with patient at chairside during C2 Folfox. Pt is returning the Enterade saying she could not tolerate the taste. Pt is doing better though after they discovered she had C.Diff so she has since been treated. Pt has gained almost 10# back in the last month and reports an increase in appetite. Denies any significant N/V/D currently.     Wt Readings from Last 10 Encounters:   04/15/25 61.1 kg (134 lb 11.2 oz)   04/15/25 61.1 kg (134 lb 11.2 oz)   04/03/25 61.3 kg (135 lb 2.3 oz)   04/01/25 61.4 kg (135 lb 5.8 oz)   03/28/25 60.7 kg (133 lb 13.1 oz)   03/16/25 57.1 kg (125 lb 14.1 oz)   03/12/25 56.5 kg (124 lb 9 oz)   02/28/25 61.9 kg (136 lb 7.4 oz)   02/26/25 62.7 kg (138 lb 3.7 oz)   02/25/25 66 kg (145 lb 8.1 oz)       All other nutrition questions/concerns addressed as appropriate. Will continue to follow and monitor throughout treatment PRN.     Franchesca Khan, MS, RD, LDN  04/15/2025  2:56 PM

## 2025-04-17 ENCOUNTER — INFUSION (OUTPATIENT)
Dept: INFUSION THERAPY | Facility: HOSPITAL | Age: 55
End: 2025-04-17
Attending: INTERNAL MEDICINE
Payer: COMMERCIAL

## 2025-04-17 VITALS
SYSTOLIC BLOOD PRESSURE: 114 MMHG | WEIGHT: 134.69 LBS | DIASTOLIC BLOOD PRESSURE: 71 MMHG | BODY MASS INDEX: 19.33 KG/M2 | RESPIRATION RATE: 18 BRPM | HEART RATE: 77 BPM | TEMPERATURE: 98 F

## 2025-04-17 DIAGNOSIS — C18.7 CANCER OF SIGMOID COLON: Primary | ICD-10-CM

## 2025-04-17 PROCEDURE — A4216 STERILE WATER/SALINE, 10 ML: HCPCS | Mod: PN | Performed by: NURSE PRACTITIONER

## 2025-04-17 PROCEDURE — 25000003 PHARM REV CODE 250: Mod: PN | Performed by: NURSE PRACTITIONER

## 2025-04-17 RX ORDER — HEPARIN 100 UNIT/ML
500 SYRINGE INTRAVENOUS
Status: DISCONTINUED | OUTPATIENT
Start: 2025-04-17 | End: 2025-04-17 | Stop reason: HOSPADM

## 2025-04-17 RX ORDER — SODIUM CHLORIDE 0.9 % (FLUSH) 0.9 %
10 SYRINGE (ML) INJECTION
Status: DISCONTINUED | OUTPATIENT
Start: 2025-04-17 | End: 2025-04-17 | Stop reason: HOSPADM

## 2025-04-17 RX ORDER — PROCHLORPERAZINE EDISYLATE 5 MG/ML
10 INJECTION INTRAMUSCULAR; INTRAVENOUS ONCE AS NEEDED
Status: DISCONTINUED | OUTPATIENT
Start: 2025-04-17 | End: 2025-04-17 | Stop reason: HOSPADM

## 2025-04-17 RX ADMIN — SODIUM CHLORIDE, PRESERVATIVE FREE 10 ML: 5 INJECTION INTRAVENOUS at 11:04

## 2025-04-28 ENCOUNTER — LAB VISIT (OUTPATIENT)
Dept: LAB | Facility: HOSPITAL | Age: 55
End: 2025-04-28
Attending: INTERNAL MEDICINE
Payer: COMMERCIAL

## 2025-04-28 DIAGNOSIS — C18.7 CANCER OF SIGMOID COLON: ICD-10-CM

## 2025-04-28 LAB
ABSOLUTE EOSINOPHIL (OHS): 0.24 K/UL
ABSOLUTE MONOCYTE (OHS): 0.61 K/UL (ref 0.3–1)
ABSOLUTE NEUTROPHIL COUNT (OHS): 2.28 K/UL (ref 1.8–7.7)
ALBUMIN SERPL BCP-MCNC: 3.8 G/DL (ref 3.5–5.2)
ALP SERPL-CCNC: 86 UNIT/L (ref 40–150)
ALT SERPL W/O P-5'-P-CCNC: 41 UNIT/L (ref 10–44)
ANION GAP (OHS): 7 MMOL/L (ref 8–16)
AST SERPL-CCNC: 46 UNIT/L (ref 11–45)
BASOPHILS # BLD AUTO: 0.02 K/UL
BASOPHILS NFR BLD AUTO: 0.4 %
BILIRUB SERPL-MCNC: 0.3 MG/DL (ref 0.1–1)
BUN SERPL-MCNC: 11 MG/DL (ref 6–20)
CALCIUM SERPL-MCNC: 9.6 MG/DL (ref 8.7–10.5)
CHLORIDE SERPL-SCNC: 107 MMOL/L (ref 95–110)
CO2 SERPL-SCNC: 25 MMOL/L (ref 23–29)
CREAT SERPL-MCNC: 0.7 MG/DL (ref 0.5–1.4)
ERYTHROCYTE [DISTWIDTH] IN BLOOD BY AUTOMATED COUNT: 20.3 % (ref 11.5–14.5)
GFR SERPLBLD CREATININE-BSD FMLA CKD-EPI: >60 ML/MIN/1.73/M2
GLUCOSE SERPL-MCNC: 100 MG/DL (ref 70–110)
HCT VFR BLD AUTO: 32 % (ref 37–48.5)
HGB BLD-MCNC: 10.4 GM/DL (ref 12–16)
IMM GRANULOCYTES # BLD AUTO: 0.01 K/UL (ref 0–0.04)
IMM GRANULOCYTES NFR BLD AUTO: 0.2 % (ref 0–0.5)
LYMPHOCYTES # BLD AUTO: 1.36 K/UL (ref 1–4.8)
MAGNESIUM SERPL-MCNC: 2 MG/DL (ref 1.6–2.6)
MCH RBC QN AUTO: 31.6 PG (ref 27–31)
MCHC RBC AUTO-ENTMCNC: 32.5 G/DL (ref 32–36)
MCV RBC AUTO: 97 FL (ref 82–98)
NUCLEATED RBC (/100WBC) (OHS): 0 /100 WBC
PLATELET # BLD AUTO: 103 K/UL (ref 150–450)
PMV BLD AUTO: 10.2 FL (ref 9.2–12.9)
POTASSIUM SERPL-SCNC: 3.6 MMOL/L (ref 3.5–5.1)
PROT SERPL-MCNC: 6.7 GM/DL (ref 6–8.4)
RBC # BLD AUTO: 3.29 M/UL (ref 4–5.4)
RELATIVE EOSINOPHIL (OHS): 5.3 %
RELATIVE LYMPHOCYTE (OHS): 30.1 % (ref 18–48)
RELATIVE MONOCYTE (OHS): 13.5 % (ref 4–15)
RELATIVE NEUTROPHIL (OHS): 50.5 % (ref 38–73)
SODIUM SERPL-SCNC: 139 MMOL/L (ref 136–145)
WBC # BLD AUTO: 4.52 K/UL (ref 3.9–12.7)

## 2025-04-28 PROCEDURE — 36415 COLL VENOUS BLD VENIPUNCTURE: CPT | Mod: PN

## 2025-04-28 PROCEDURE — 85025 COMPLETE CBC W/AUTO DIFF WBC: CPT | Mod: PN

## 2025-04-28 PROCEDURE — 83735 ASSAY OF MAGNESIUM: CPT | Mod: PN

## 2025-04-28 PROCEDURE — 80053 COMPREHEN METABOLIC PANEL: CPT | Mod: PN

## 2025-04-28 NOTE — PROGRESS NOTES
PATIENT: Abbi Snider  MRN: [id number]  DATE: 4/29/2025      Diagnosis:   1. Cancer of sigmoid colon    2. Immunodeficiency due to chemotherapy    3. Normocytic anemia    4. Thrombocytopenia          Chief Complaint: Follow-up (Cancer of sigmoid colon)      Oncologic History:      Oncologic History     Oncologic Treatment     Pathology           Subjective:    Interval History: Ms. Snider is a 54 y.o. female presents with colon cancer.  Since the last clinic visit the patient has had continued fatigue.  The patient denies CP, cough, SOB, abdominal pain, nausea, vomiting, constipation, diarrhea.  The patient denies fever, chills, night sweats, weight loss, new lumps or bumps, easy bruising or bleeding.    Prior History:  The patient initially underwent colonoscopy on 12/09/2024 for workup of bright red blood per rectum.  Colonoscopy showed a partially obstructing tumor in the distal sigmoid colon.  Pathology from the procedure showed moderately differentiated adenocarcinoma with intact nuclear expression of MLH1, PMS2, MSH2, and MSH6.  CT of the abdomen and pelvis on 12/11/2024 showed multiple hepatic hypodensities many which too small to characterize with the largest measuring 10 mm in the lateral segment of the left hepatic lobe; circumferential colonic wall thickening approximately 5.6 cm in length within the sigmoid colon; and a hypodense mesenteric lymph node measuring 17 x 19 mm.  CT chest on 12/18/2024 showed no evidence of metastatic disease in the chest.  The patient underwent a robotic hemicolectomy under the care of Dr Brandt on 12/23/24 with path showing adenocarcinoma, moderately differentiated, measuring 5.8 x 3.5 x 1.6 cm with invasion through the bowel wall just into the fat, negative margins, 2/24 lymph nodes positive pT3 pN1b.   Pharmacogenomic testing showed normal DPYD metabolism.  MRI [facility] on 1/18/25 showed SHARONA.    Past Medical History:   Past Medical History:   Diagnosis Date    Colon  cancer     Murmur, cardiac     since childhood       Past Surgical HIstory:   Past Surgical History:   Procedure Laterality Date     SECTION, CLASSIC      COLONOSCOPY N/A 2024    Procedure: COLONOSCOPY;  Surgeon: Graeme Donahue MD;  Location: [facility] ENDO;  Service: Gastroenterology;  Laterality: N/A;    DV5 ROBOTIC COLECTOMY, SIGMOID Left 2024    Procedure: DV5 ROBOTIC COLECTOMY, SIGMOID W/ERAS;  Surgeon: ETIENNE Brandt MD;  Location: [facility] OR;  Service: Colon and Rectal;  Laterality: Left;    FLEXIBLE SIGMOIDOSCOPY N/A 2024    Procedure: SIGMOIDOSCOPY, FLEXIBLE;  Surgeon: ETIENNE Brandt MD;  Location: [facility] OR;  Service: Colon and Rectal;  Laterality: N/A;    INSERTION OF TUNNELED CENTRAL VENOUS CATHETER (CVC) WITH SUBCUTANEOUS PORT N/A 2025    Procedure: [id number];  Surgeon: Terry Petit MD;  Location: [facility] OR;  Service: General;  Laterality: N/A;    MOBILIZATION OF SPLENIC FLEXURE N/A 2024    Procedure: MOBILIZATION, SPLENIC FLEXURE;  Surgeon: ETIENNE Brandt MD;  [facility];  Service: Colon and Rectal;  Laterality: N/A;    WISDOM TOOTH EXTRACTION         Family History:   Family History   Problem Relation Name Age of Onset    Cancer Mother          liver due to transplant med    Heart disease Father      Breast cancer Maternal Grandmother      Cancer Maternal Grandmother  41        breast cancer       Social History:  reports that she has never smoked. She has never been exposed to tobacco smoke. She has never used smokeless tobacco. She reports that she does not currently use alcohol. She reports that she does not use drugs.    Allergies:  Review of patient's allergies indicates:   Allergen Reactions    Contrast media Rash       Medications:  Current Outpatient Medications   Medication Sig Dispense Refill    capecitabine (XELODA) 500 MG Tab Take 3 tablets (1,500 mg total) by mouth 2 (two) times daily on days 1-14 of a 21 day cycle. 84 tablet 5     ibuprofen (ADVIL,MOTRIN) 600 MG tablet Take 1 tablet (600 mg total) by mouth 3 (three) times daily. 15 tablet 0    Lactobacillus rhamnosus GG (CULTURELLE) 10 billion cell capsule Take 1 capsule by mouth once daily. 20 capsule 0    LIDOcaine-prilocaine (EMLA) cream Apply topically as needed (apply 30 to 60 minutes prior to chemo). 30 g 2    magnesium oxide (MAG-OX) 400 mg (241.3 mg magnesium) tablet Take 1 tablet (400 mg total) by mouth once daily. 7 tablet 0    multivit-mins no.63/iron/folic (M-VIT ORAL) Take 1 tablet by mouth once daily.      OLANZapine (ZYPREXA) 5 MG tablet Take 1 tablet (5 mg total) by mouth every evening. Take nightly on days 1-3 of each chemotherapy cycle. 6 tablet 11    ondansetron (ZOFRAN-ODT) 4 MG TbDL Take 1 tablet (4 mg total) by mouth every 6 (six) hours as needed (nausea). 60 tablet 3    oxyCODONE (ROXICODONE) 5 MG immediate release tablet Take 1 tablet (5 mg total) by mouth every 4 (four) hours as needed for Pain. 30 tablet 0    potassium chloride SA (K-DUR,KLOR-CON) 20 MEQ tablet Take 1 tablet (20 mEq total) by mouth once daily. 3 tablet 0    prochlorperazine (COMPAZINE) 10 MG tablet Take 1 tablet (10 mg total) by mouth every 6 (six) hours as needed. 30 tablet 1    prochlorperazine (COMPAZINE) 10 MG tablet Take 1 tablet (10 mg total) by mouth every 6 (six) hours as needed. 15 tablet 0    promethazine (PHENERGAN) 25 MG tablet Take 1 tablet (25 mg total) by mouth every 4 (four) hours. 60 tablet 3    urea (CARMOL) 40 % Crea Apply topically 2 (two) times daily. 85 g 3     No current facility-administered medications for this visit.     Facility-Administered Medications Ordered in Other Visits   Medication Dose Route Frequency Provider Last Rate Last Admin    0.9% NaCl 250 mL flush bag   Intravenous 1 time in Clinic/[facility] Dragan Brian MD        D5W 250 mL flush bag   Intravenous 1 time in Clinic/[facility] Dragan Brian MD        diphenhydrAMINE injection 50 mg  50 mg Intravenous Once  "PRN Dragan Brian MD        EPINEPHrine (EPIPEN) 0.3 mg/0.3 mL pen injection 0.3 mg  0.3 mg Intramuscular Once PRN Dragan Brian MD        fluorouracil (Adrucil) 2,400 mg/m2 = 4,175 mg in 0.9% NaCl 100 mL chemo infusion  2,400 mg/m2 Intravenous over 46 hr Dragan Brian MD        fluorouraciL injection 695 mg  400 mg/m2 Intravenous 1 time in Clinic/[facility] Dragan Brian MD        hydrocortisone sodium succinate injection 100 mg  100 mg Intravenous Once PRN Dragan Brian MD        leucovorin calcium 400 mg/m2 = 695 mg in D5W 250 mL infusion  400 mg/m2 Intravenous 1 time in Clinic/[facility] Dragan Brian MD        oxaliplatin (ELOXATIN) 85 mg/m2 = 148 mg in D5W 529.6 mL chemo infusion  85 mg/m2 Intravenous 1 time in Clinic/[facility] Dragan Brian MD        palonosetron 0.25mg/dexAMETHasone 12mg in NS IVPB 0.25 mg 50 mL  0.25 mg Intravenous 1 time in Clinic/[facility] Dragan Brian MD        prochlorperazine injection Soln 10 mg  10 mg Intravenous Once PRN Dragan Brian MD        sodium chloride 0.9% flush 10 mL  10 mL Intravenous PRN Dragan Brian MD           Review of Systems   Constitutional:  Positive for fatigue. Negative for chills, fever and unexpected weight change.   Respiratory:  Negative for cough and shortness of breath.    Cardiovascular:  Negative for chest pain and palpitations.   Gastrointestinal:  Negative for abdominal pain, constipation, diarrhea, nausea and vomiting.   Skin:  Negative for rash.   Neurological:  Negative for headaches.   Hematological:  Negative for adenopathy. Does not bruise/bleed easily.       ECOG Performance Status: 0   Objective:      Vitals:   Vitals:    04/29/25 0840   BP: 108/64   Pulse: 103   Resp: 16   Temp: 97.6 °F (36.4 °C)   TempSrc: Temporal   SpO2: 99%   Weight: 61.1 kg (134 lb 11.2 oz)   Height: 5' 10" (1.778 m)               Physical Exam  Constitutional:       General: She is not in acute distress.     Appearance: She is well-developed. She is not " diaphoretic.   HENT:      Head: Normocephalic and atraumatic.   Cardiovascular:      Rate and Rhythm: Normal rate and regular rhythm.      Heart sounds: Normal heart sounds. No murmur heard.     No friction rub. No gallop.   Pulmonary:      Effort: Pulmonary effort is normal. No respiratory distress.      Breath sounds: Normal breath sounds. No wheezing or rales.   Chest:      Chest wall: No tenderness.   Abdominal:      General: Bowel sounds are normal. There is no distension.      Palpations: Abdomen is soft. There is no mass.      Tenderness: There is no abdominal tenderness. There is no guarding or rebound.   Lymphadenopathy:      Cervical: No cervical adenopathy.      Upper Body:      Right upper body: No supraclavicular or axillary adenopathy.      Left upper body: No supraclavicular or axillary adenopathy.   Skin:     Findings: No erythema or rash.   Neurological:      Mental Status: She is alert and oriented to person, place, and time.   Psychiatric:         Behavior: Behavior normal.         Laboratory Data:  Lab Visit on 04/28/2025   Component Date Value Ref Range Status    Sodium 04/28/2025 139  136 - 145 mmol/L Final    Potassium 04/28/2025 3.6  3.5 - 5.1 mmol/L Final    Chloride 04/28/2025 107  95 - 110 mmol/L Final    CO2 04/28/2025 25  23 - 29 mmol/L Final    Glucose 04/28/2025 100  70 - 110 mg/dL Final    BUN 04/28/2025 11  6 - 20 mg/dL Final    Creatinine 04/28/2025 0.7  0.5 - 1.4 mg/dL Final    Calcium 04/28/2025 9.6  8.7 - 10.5 mg/dL Final    Protein Total 04/28/2025 6.7  6.0 - 8.4 gm/dL Final    Albumin 04/28/2025 3.8  3.5 - 5.2 g/dL Final    Bilirubin Total 04/28/2025 0.3  0.1 - 1.0 mg/dL Final    For infants and newborns, interpretation of results should be based   on gestational age, weight and in agreement with clinical   observations.    Premature Infant recommended reference ranges:   0-24 hours:  <8.0 mg/dL   24-48 hours: <12.0 mg/dL   3-5 days:    <15.0 mg/dL   6-29 days:   <15.0 mg/dL     ALP 04/28/2025 86  40 - 150 unit/L Final    AST 04/28/2025 46 (H)  11 - 45 unit/L Final    ALT 04/28/2025 41  10 - 44 unit/L Final    Anion Gap 04/28/2025 7 (L)  8 - 16 mmol/L Final    eGFR 04/28/2025 >60  >60 mL/min/1.73/m2 Final    Estimated GFR calculated using the CKD-EPI creatinine (2021) equation.    Magnesium  04/28/2025 2.0  1.6 - 2.6 mg/dL Final    WBC 04/28/2025 4.52  3.90 - 12.70 K/uL Final    RBC 04/28/2025 3.29 (L)  4.00 - 5.40 M/uL Final    HGB 04/28/2025 10.4 (L)  12.0 - 16.0 gm/dL Final    HCT 04/28/2025 32.0 (L)  37.0 - 48.5 % Final    MCV 04/28/2025 97  82 - 98 fL Final    MCH 04/28/2025 31.6 (H)  27.0 - 31.0 pg Final    MCHC 04/28/2025 32.5  32.0 - 36.0 g/dL Final    RDW 04/28/2025 20.3 (H)  11.5 - 14.5 % Final    Platelet Count 04/28/2025 103 (L)  150 - 450 K/uL Final    MPV 04/28/2025 10.2  9.2 - 12.9 fL Final    Nucleated RBC 04/28/2025 0  <=0 /100 WBC Final    Neut % 04/28/2025 50.5  38 - 73 % Final    Lymph % 04/28/2025 30.1  18 - 48 % Final    Mono % 04/28/2025 13.5  4 - 15 % Final    Eos % 04/28/2025 5.3  <=8 % Final    Basophil % 04/28/2025 0.4  <=1.9 % Final    Imm Grans % 04/28/2025 0.2  0.0 - 0.5 % Final    Neut # 04/28/2025 2.28  1.8 - 7.7 K/uL Final    Lymph # 04/28/2025 1.36  1 - 4.8 K/uL Final    Mono # 04/28/2025 0.61  0.3 - 1 K/uL Final    Eos # 04/28/2025 0.24  <=0.5 K/uL Final    Baso # 04/28/2025 0.02  <=0.2 K/uL Final    Imm Grans # 04/28/2025 0.01  0.00 - 0.04 K/uL Final    Mild elevation in immature granulocytes is non specific and can be seen in a variety of conditions including stress response, acute inflammation, trauma and pregnancy. Correlation with other laboratory and clinical findings is essential.         Imaging:     MRI Abdomen 1/18/25    The liver is normal in size and contour.  A few tiny cysts are scattered about the hepatic parenchyma.  There are no suspicious hepatic lesions.     The pancreas, spleen, adrenal glands, and kidneys are unremarkable.  The  patient's known sigmoid mass is partially visualized on some sequences.       Assessment:       1. Cancer of sigmoid colon    2. Immunodeficiency due to chemotherapy    3. Normocytic anemia    4. 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 diagnosed on hemicolectomy 12/23/2024   -The patient with intact nuclear expression of MLH1, PMS2, MSH2, and MSH6  -The patient with nodule seen in the liver on CT abdomen 12/11/2024   -MRI abdomen 1/18/25 showed cysts in the liver with no concern for mets  -Pharmacogenomic panel showed normal DPYD metabolism  -PT was to be on CAPOX for 3 months  -Capecitabine dosing 850mg/mm2 twice daily for 14/21 day cycle  -Pt completed 2 cycles of Capecitabine but had significant symptoms on treatment  -pPt tolerated 2 cycles of FOLFOX  -Will proceed with 3rd and last cycle of FOLFOX  Visit today included increased complexity associated with the care of the episodic problem (chemotherapy) addressed and managing the longitudinal care of the patient due to the serious and/or complex managed problem(s) colon cancer.    Immunodeficiency due to chemo - pt at increased risk of infection  -No current signs of infection  -Will monitor    Normocytic Anemia - Stable  Lab Results   Component Value Date    HGB 10.4 (L) 04/28/2025   -Ferritin 22ng/mL on 2/07/25 with normal TIBC  -B12 and folate normal  -PT to start taking oral iron supplement  -Will monitor    Thrombocytopenia - likely from shemo  Lab Results   Component Value Date     (L) 04/28/2025   -Will monitor    Route Chart for Scheduling    Med Onc Chart Routing      Follow up with physician Other. Pt can proceed with treatment.  Pt needs a CBC, CMP, CEA, CT C/A/P 8/2025 with a return aptp with m,e and PORt flush.  PORT flush can be moved from July to August.   Follow up with [facility]    Infusion scheduling note    Injection scheduling note    Labs    Imaging     Pharmacy appointment    Other referrals              Treatment Plan Information   OP GI [id number] (oxaliplatin leucovorin fluorouracil) Q2W Dragan Brian MD   Associated diagnosis: Cancer of sigmoid colon Stage IIIB pT3, pN1b, cM0 noted on 12/18/2024   Line of treatment: Adjuvant  Treatment Goal: Curative     Upcoming Treatment Dates - OP GI [id number] (oxaliplatin leucovorin fluorouracil) Q2W    No upcoming days in selected categories.    Therapy Plan Information  INF FLUIDS for Dehydration, noted on 2/26/2025  IV Fluids  sodium chloride 0.9% bolus 1,000 mL 1,000 mL  1,000 mL, Intravenous, Every visit  Flushes  sodium chloride 0.9% flush 10 mL  10 mL, Intravenous, PRN  heparin, porcine (PF) 100 unit/mL injection flush 500 Units  500 Units, Intravenous, PRN      No therapy plan of the specified type found.    No therapy plan of the specified type found.      Dragan Brian MD  [facility]  Hematology and Oncology  [facility]   [address] [place] [address]   O: [phone]  F: [phone]

## 2025-04-29 ENCOUNTER — INFUSION (OUTPATIENT)
Dept: INFUSION THERAPY | Facility: HOSPITAL | Age: 55
End: 2025-04-29
Attending: INTERNAL MEDICINE
Payer: COMMERCIAL

## 2025-04-29 ENCOUNTER — OFFICE VISIT (OUTPATIENT)
Dept: HEMATOLOGY/ONCOLOGY | Facility: CLINIC | Age: 55
End: 2025-04-29
Payer: COMMERCIAL

## 2025-04-29 ENCOUNTER — DOCUMENTATION ONLY (OUTPATIENT)
Dept: INFUSION THERAPY | Facility: HOSPITAL | Age: 55
End: 2025-04-29
Payer: COMMERCIAL

## 2025-04-29 VITALS
SYSTOLIC BLOOD PRESSURE: 108 MMHG | DIASTOLIC BLOOD PRESSURE: 64 MMHG | HEIGHT: 70 IN | RESPIRATION RATE: 16 BRPM | WEIGHT: 134.69 LBS | TEMPERATURE: 98 F | BODY MASS INDEX: 19.28 KG/M2 | OXYGEN SATURATION: 99 % | HEART RATE: 103 BPM

## 2025-04-29 VITALS
DIASTOLIC BLOOD PRESSURE: 73 MMHG | HEART RATE: 96 BPM | BODY MASS INDEX: 19.28 KG/M2 | RESPIRATION RATE: 16 BRPM | OXYGEN SATURATION: 99 % | WEIGHT: 134.69 LBS | SYSTOLIC BLOOD PRESSURE: 121 MMHG | HEIGHT: 70 IN | TEMPERATURE: 98 F

## 2025-04-29 DIAGNOSIS — T45.1X5A IMMUNODEFICIENCY DUE TO CHEMOTHERAPY: ICD-10-CM

## 2025-04-29 DIAGNOSIS — D64.9 NORMOCYTIC ANEMIA: ICD-10-CM

## 2025-04-29 DIAGNOSIS — D84.821 IMMUNODEFICIENCY DUE TO CHEMOTHERAPY: ICD-10-CM

## 2025-04-29 DIAGNOSIS — C18.7 CANCER OF SIGMOID COLON: Primary | ICD-10-CM

## 2025-04-29 DIAGNOSIS — E86.0 DEHYDRATION: ICD-10-CM

## 2025-04-29 DIAGNOSIS — D69.6 THROMBOCYTOPENIA: ICD-10-CM

## 2025-04-29 DIAGNOSIS — Z79.69 IMMUNODEFICIENCY DUE TO CHEMOTHERAPY: ICD-10-CM

## 2025-04-29 PROCEDURE — 96411 CHEMO IV PUSH ADDL DRUG: CPT | Mod: PN

## 2025-04-29 PROCEDURE — 96367 TX/PROPH/DG ADDL SEQ IV INF: CPT | Mod: PN

## 2025-04-29 PROCEDURE — 63600175 PHARM REV CODE 636 W HCPCS: Mod: PN | Performed by: INTERNAL MEDICINE

## 2025-04-29 PROCEDURE — 25000003 PHARM REV CODE 250: Mod: PN

## 2025-04-29 PROCEDURE — 99999 PR PBB SHADOW E&M-EST. PATIENT-LVL IV: CPT | Mod: PBBFAC,,, | Performed by: INTERNAL MEDICINE

## 2025-04-29 PROCEDURE — 96368 THER/DIAG CONCURRENT INF: CPT | Mod: PN

## 2025-04-29 PROCEDURE — 25000003 PHARM REV CODE 250: Mod: PN | Performed by: INTERNAL MEDICINE

## 2025-04-29 PROCEDURE — 3008F BODY MASS INDEX DOCD: CPT | Mod: CPTII,S$GLB,, | Performed by: INTERNAL MEDICINE

## 2025-04-29 PROCEDURE — 96416 CHEMO PROLONG INFUSE W/PUMP: CPT | Mod: PN

## 2025-04-29 PROCEDURE — G2211 COMPLEX E/M VISIT ADD ON: HCPCS | Mod: S$GLB,,, | Performed by: INTERNAL MEDICINE

## 2025-04-29 PROCEDURE — 3078F DIAST BP <80 MM HG: CPT | Mod: CPTII,S$GLB,, | Performed by: INTERNAL MEDICINE

## 2025-04-29 PROCEDURE — 96361 HYDRATE IV INFUSION ADD-ON: CPT | Mod: PN

## 2025-04-29 PROCEDURE — 96413 CHEMO IV INFUSION 1 HR: CPT | Mod: PN

## 2025-04-29 PROCEDURE — 96415 CHEMO IV INFUSION ADDL HR: CPT | Mod: PN

## 2025-04-29 PROCEDURE — 3074F SYST BP LT 130 MM HG: CPT | Mod: CPTII,S$GLB,, | Performed by: INTERNAL MEDICINE

## 2025-04-29 PROCEDURE — 99215 OFFICE O/P EST HI 40 MIN: CPT | Mod: S$GLB,,, | Performed by: INTERNAL MEDICINE

## 2025-04-29 RX ORDER — SODIUM CHLORIDE 0.9 % (FLUSH) 0.9 %
10 SYRINGE (ML) INJECTION
Status: CANCELLED | OUTPATIENT
Start: 2025-04-29

## 2025-04-29 RX ORDER — HEPARIN 100 UNIT/ML
500 SYRINGE INTRAVENOUS
Status: CANCELLED | OUTPATIENT
Start: 2025-04-30

## 2025-04-29 RX ORDER — HEPARIN 100 UNIT/ML
500 SYRINGE INTRAVENOUS
Status: CANCELLED | OUTPATIENT
Start: 2025-04-29

## 2025-04-29 RX ORDER — PROCHLORPERAZINE EDISYLATE 5 MG/ML
10 INJECTION INTRAMUSCULAR; INTRAVENOUS ONCE AS NEEDED
Status: CANCELLED | OUTPATIENT
Start: 2025-04-29

## 2025-04-29 RX ORDER — DIPHENHYDRAMINE HYDROCHLORIDE 50 MG/ML
50 INJECTION, SOLUTION INTRAMUSCULAR; INTRAVENOUS ONCE AS NEEDED
Status: DISCONTINUED | OUTPATIENT
Start: 2025-04-29 | End: 2025-04-29 | Stop reason: HOSPADM

## 2025-04-29 RX ORDER — SODIUM CHLORIDE 0.9 % (FLUSH) 0.9 %
10 SYRINGE (ML) INJECTION
OUTPATIENT
Start: 2025-04-29

## 2025-04-29 RX ORDER — SODIUM CHLORIDE 0.9 % (FLUSH) 0.9 %
10 SYRINGE (ML) INJECTION
Status: CANCELLED | OUTPATIENT
Start: 2025-04-30

## 2025-04-29 RX ORDER — PROCHLORPERAZINE EDISYLATE 5 MG/ML
10 INJECTION INTRAMUSCULAR; INTRAVENOUS ONCE AS NEEDED
Status: DISCONTINUED | OUTPATIENT
Start: 2025-04-29 | End: 2025-04-29 | Stop reason: HOSPADM

## 2025-04-29 RX ORDER — EPINEPHRINE 0.3 MG/.3ML
0.3 INJECTION SUBCUTANEOUS ONCE AS NEEDED
Status: CANCELLED | OUTPATIENT
Start: 2025-04-29

## 2025-04-29 RX ORDER — PROCHLORPERAZINE EDISYLATE 5 MG/ML
10 INJECTION INTRAMUSCULAR; INTRAVENOUS ONCE AS NEEDED
Status: CANCELLED | OUTPATIENT
Start: 2025-04-30

## 2025-04-29 RX ORDER — SODIUM CHLORIDE 9 MG/ML
1000 INJECTION, SOLUTION INTRAVENOUS
Status: COMPLETED | OUTPATIENT
Start: 2025-04-29 | End: 2025-04-29

## 2025-04-29 RX ORDER — DIPHENHYDRAMINE HYDROCHLORIDE 50 MG/ML
50 INJECTION, SOLUTION INTRAMUSCULAR; INTRAVENOUS ONCE AS NEEDED
Status: CANCELLED | OUTPATIENT
Start: 2025-04-29

## 2025-04-29 RX ORDER — FLUOROURACIL 50 MG/ML
400 INJECTION, SOLUTION INTRAVENOUS
Status: COMPLETED | OUTPATIENT
Start: 2025-04-29 | End: 2025-04-29

## 2025-04-29 RX ORDER — FLUOROURACIL 50 MG/ML
400 INJECTION, SOLUTION INTRAVENOUS
Status: CANCELLED | OUTPATIENT
Start: 2025-04-29

## 2025-04-29 RX ORDER — HEPARIN 100 UNIT/ML
500 SYRINGE INTRAVENOUS
OUTPATIENT
Start: 2025-04-29

## 2025-04-29 RX ORDER — SODIUM CHLORIDE 0.9 % (FLUSH) 0.9 %
10 SYRINGE (ML) INJECTION
Status: DISCONTINUED | OUTPATIENT
Start: 2025-04-29 | End: 2025-04-29 | Stop reason: HOSPADM

## 2025-04-29 RX ORDER — EPINEPHRINE 0.3 MG/.3ML
0.3 INJECTION SUBCUTANEOUS ONCE AS NEEDED
Status: DISCONTINUED | OUTPATIENT
Start: 2025-04-29 | End: 2025-04-29 | Stop reason: HOSPADM

## 2025-04-29 RX ADMIN — DEXTROSE MONOHYDRATE: 5 INJECTION, SOLUTION INTRAVENOUS at 09:04

## 2025-04-29 RX ADMIN — FLUOROURACIL 4000 MG: 50 INJECTION, SOLUTION INTRAVENOUS at 02:04

## 2025-04-29 RX ADMIN — OXALIPLATIN 148 MG: 5 INJECTION, SOLUTION INTRAVENOUS at 10:04

## 2025-04-29 RX ADMIN — FLUOROURACIL 695 MG: 50 INJECTION, SOLUTION INTRAVENOUS at 02:04

## 2025-04-29 RX ADMIN — DEXAMETHASONE SODIUM PHOSPHATE 0.25 MG: 10 INJECTION, SOLUTION INTRAMUSCULAR; INTRAVENOUS at 09:04

## 2025-04-29 RX ADMIN — LEUCOVORIN CALCIUM 695 MG: 350 INJECTION, POWDER, LYOPHILIZED, FOR SUSPENSION INTRAMUSCULAR; INTRAVENOUS at 10:04

## 2025-04-29 RX ADMIN — SODIUM CHLORIDE 1000 ML: 9 INJECTION, SOLUTION INTRAVENOUS at 01:04

## 2025-04-29 NOTE — PROGRESS NOTES
Oncology   Chemotherapy Infusion Visit    Quick Social Service Status Follow Up   Met w/ pt briefly to follow up on social and emotional needs.  Pt reports she decided not to move in with her daughter. She appreciates her offer but would rather remain in her home with her tow cats. Pt shared today is her last treatment. She is proud of getting to this point. Pt plans to ring the bell when she get her pump DC in two days. SW shared in her good news and provided support. Pt denied any other needs from  at this time.        Jasmin Peterson, THELMA  04/29/2025  11:23 AM

## 2025-04-29 NOTE — PLAN OF CARE
Pt arrived to clinic for Folfox treatment and tolerated well with no changes throughout therapy. Pt educated on line care at home and aware of number to call for any pump issues. Pt with chemo spill kit and aware of how to use if needed. Pt aware of side effects of meds and aware of f/u appts and discharged to home in NAD with 5FU pump infusing with ease. Pt reminded about cold sensitivity due to Oxaliplatin.

## 2025-05-01 ENCOUNTER — INFUSION (OUTPATIENT)
Dept: INFUSION THERAPY | Facility: HOSPITAL | Age: 55
End: 2025-05-01
Attending: INTERNAL MEDICINE
Payer: COMMERCIAL

## 2025-05-01 VITALS
HEART RATE: 96 BPM | SYSTOLIC BLOOD PRESSURE: 121 MMHG | RESPIRATION RATE: 18 BRPM | TEMPERATURE: 98 F | OXYGEN SATURATION: 99 % | DIASTOLIC BLOOD PRESSURE: 73 MMHG

## 2025-05-01 DIAGNOSIS — C18.7 CANCER OF SIGMOID COLON: Primary | ICD-10-CM

## 2025-05-01 PROCEDURE — A4216 STERILE WATER/SALINE, 10 ML: HCPCS | Mod: PN | Performed by: INTERNAL MEDICINE

## 2025-05-01 PROCEDURE — 25000003 PHARM REV CODE 250: Mod: PN | Performed by: INTERNAL MEDICINE

## 2025-05-01 RX ORDER — PROCHLORPERAZINE EDISYLATE 5 MG/ML
10 INJECTION INTRAMUSCULAR; INTRAVENOUS ONCE AS NEEDED
Status: DISCONTINUED | OUTPATIENT
Start: 2025-05-01 | End: 2025-05-01 | Stop reason: HOSPADM

## 2025-05-01 RX ORDER — SODIUM CHLORIDE 0.9 % (FLUSH) 0.9 %
10 SYRINGE (ML) INJECTION
Status: DISCONTINUED | OUTPATIENT
Start: 2025-05-01 | End: 2025-05-01 | Stop reason: HOSPADM

## 2025-05-01 RX ORDER — HEPARIN 100 UNIT/ML
500 SYRINGE INTRAVENOUS
Status: DISCONTINUED | OUTPATIENT
Start: 2025-05-01 | End: 2025-05-01 | Stop reason: HOSPADM

## 2025-05-01 RX ADMIN — SODIUM CHLORIDE, PRESERVATIVE FREE 10 ML: 5 INJECTION INTRAVENOUS at 12:05

## 2025-07-03 ENCOUNTER — PATIENT MESSAGE (OUTPATIENT)
Dept: HEMATOLOGY/ONCOLOGY | Facility: CLINIC | Age: 55
End: 2025-07-03
Payer: COMMERCIAL

## 2025-08-05 ENCOUNTER — PATIENT MESSAGE (OUTPATIENT)
Dept: HEMATOLOGY/ONCOLOGY | Facility: CLINIC | Age: 55
End: 2025-08-05
Payer: COMMERCIAL

## 2025-08-05 DIAGNOSIS — Z88.8 ALLERGY TO IODINE: Primary | ICD-10-CM

## 2025-08-11 RX ORDER — PREDNISONE 50 MG/1
50 TABLET ORAL 3 TIMES DAILY
Qty: 3 TABLET | Refills: 0 | Status: SHIPPED | OUTPATIENT
Start: 2025-08-11 | End: 2026-08-11

## 2025-08-11 RX ORDER — DIPHENHYDRAMINE HCL 25 MG
50 TABLET ORAL ONCE
Qty: 1 TABLET | Refills: 0 | Status: SHIPPED | OUTPATIENT
Start: 2025-08-11 | End: 2025-08-11

## 2025-08-15 ENCOUNTER — HOSPITAL ENCOUNTER (OUTPATIENT)
Dept: RADIOLOGY | Facility: HOSPITAL | Age: 55
Discharge: HOME | End: 2025-08-15
Attending: INTERNAL MEDICINE
Payer: COMMERCIAL

## 2025-08-15 DIAGNOSIS — C18.7 CANCER OF SIGMOID COLON: ICD-10-CM

## 2025-08-15 PROCEDURE — 25500020 PHARM REV CODE 255: Mod: PO | Performed by: INTERNAL MEDICINE

## 2025-08-15 PROCEDURE — 71260 CT THORAX DX C+: CPT | Mod: TC,PO

## 2025-08-15 PROCEDURE — 71260 CT THORAX DX C+: CPT | Mod: 26,,, | Performed by: STUDENT IN AN ORGANIZED HEALTH CARE EDUCATION/TRAINING PROGRAM

## 2025-08-15 PROCEDURE — A9698 NON-RAD CONTRAST MATERIALNOC: HCPCS | Mod: PO | Performed by: INTERNAL MEDICINE

## 2025-08-15 PROCEDURE — 74177 CT ABD & PELVIS W/CONTRAST: CPT | Mod: 26,,, | Performed by: STUDENT IN AN ORGANIZED HEALTH CARE EDUCATION/TRAINING PROGRAM

## 2025-08-15 RX ADMIN — IOHEXOL 75 ML: 350 INJECTION, SOLUTION INTRAVENOUS at 10:08

## 2025-08-15 RX ADMIN — IOHEXOL 1000 ML: 9 SOLUTION ORAL at 10:08

## 2025-08-18 RX ORDER — SODIUM CHLORIDE 0.9 % (FLUSH) 0.9 %
10 SYRINGE (ML) INJECTION
OUTPATIENT
Start: 2025-08-18

## 2025-08-18 RX ORDER — HEPARIN 100 UNIT/ML
500 SYRINGE INTRAVENOUS
OUTPATIENT
Start: 2025-08-18

## 2025-08-19 ENCOUNTER — OFFICE VISIT (OUTPATIENT)
Dept: HEMATOLOGY/ONCOLOGY | Facility: CLINIC | Age: 55
End: 2025-08-19
Attending: INTERNAL MEDICINE
Payer: COMMERCIAL

## 2025-08-19 ENCOUNTER — INFUSION (OUTPATIENT)
Dept: INFUSION THERAPY | Facility: HOSPITAL | Age: 55
End: 2025-08-19
Attending: INTERNAL MEDICINE
Payer: COMMERCIAL

## 2025-08-19 VITALS
BODY MASS INDEX: 21.3 KG/M2 | OXYGEN SATURATION: 98 % | TEMPERATURE: 98 F | DIASTOLIC BLOOD PRESSURE: 73 MMHG | WEIGHT: 148.81 LBS | RESPIRATION RATE: 20 BRPM | SYSTOLIC BLOOD PRESSURE: 125 MMHG | HEIGHT: 70 IN | HEART RATE: 98 BPM

## 2025-08-19 DIAGNOSIS — C18.7 CANCER OF SIGMOID COLON: Primary | ICD-10-CM

## 2025-08-19 DIAGNOSIS — E86.0 DEHYDRATION: Primary | ICD-10-CM

## 2025-08-19 PROCEDURE — 3078F DIAST BP <80 MM HG: CPT | Mod: CPTII,S$GLB,, | Performed by: INTERNAL MEDICINE

## 2025-08-19 PROCEDURE — 3008F BODY MASS INDEX DOCD: CPT | Mod: CPTII,S$GLB,, | Performed by: INTERNAL MEDICINE

## 2025-08-19 PROCEDURE — 96523 IRRIG DRUG DELIVERY DEVICE: CPT | Mod: PN

## 2025-08-19 PROCEDURE — 99999 PR PBB SHADOW E&M-EST. PATIENT-LVL IV: CPT | Mod: PBBFAC,,, | Performed by: INTERNAL MEDICINE

## 2025-08-19 PROCEDURE — 25000003 PHARM REV CODE 250: Mod: PN

## 2025-08-19 PROCEDURE — A4216 STERILE WATER/SALINE, 10 ML: HCPCS | Mod: PN

## 2025-08-19 PROCEDURE — 99213 OFFICE O/P EST LOW 20 MIN: CPT | Mod: S$GLB,,, | Performed by: INTERNAL MEDICINE

## 2025-08-19 PROCEDURE — 3074F SYST BP LT 130 MM HG: CPT | Mod: CPTII,S$GLB,, | Performed by: INTERNAL MEDICINE

## 2025-08-19 PROCEDURE — 1159F MED LIST DOCD IN RCRD: CPT | Mod: CPTII,S$GLB,, | Performed by: INTERNAL MEDICINE

## 2025-08-19 RX ORDER — SODIUM CHLORIDE 9 MG/ML
1000 INJECTION, SOLUTION INTRAVENOUS
Status: DISCONTINUED | OUTPATIENT
Start: 2025-08-19 | End: 2025-08-19 | Stop reason: HOSPADM

## 2025-08-19 RX ORDER — DIPHENHYDRAMINE HCL 25 MG
25 CAPSULE ORAL
COMMUNITY

## 2025-08-19 RX ORDER — SODIUM CHLORIDE 0.9 % (FLUSH) 0.9 %
10 SYRINGE (ML) INJECTION
OUTPATIENT
Start: 2025-08-19

## 2025-08-19 RX ORDER — SODIUM CHLORIDE 0.9 % (FLUSH) 0.9 %
10 SYRINGE (ML) INJECTION
Status: DISCONTINUED | OUTPATIENT
Start: 2025-08-19 | End: 2025-08-19 | Stop reason: HOSPADM

## 2025-08-19 RX ORDER — HEPARIN 100 UNIT/ML
500 SYRINGE INTRAVENOUS
OUTPATIENT
Start: 2025-08-19

## 2025-08-19 RX ADMIN — SODIUM CHLORIDE, PRESERVATIVE FREE 10 ML: 5 INJECTION INTRAVENOUS at 12:08

## 2025-08-20 ENCOUNTER — PATIENT MESSAGE (OUTPATIENT)
Dept: ADMINISTRATIVE | Facility: HOSPITAL | Age: 55
End: 2025-08-20
Payer: COMMERCIAL